# Patient Record
Sex: FEMALE | Race: WHITE | Employment: FULL TIME | URBAN - METROPOLITAN AREA
[De-identification: names, ages, dates, MRNs, and addresses within clinical notes are randomized per-mention and may not be internally consistent; named-entity substitution may affect disease eponyms.]

---

## 2022-12-08 ENCOUNTER — OFFICE VISIT (OUTPATIENT)
Dept: OBGYN CLINIC | Facility: CLINIC | Age: 32
End: 2022-12-08

## 2022-12-08 VITALS
WEIGHT: 119.4 LBS | BODY MASS INDEX: 20.38 KG/M2 | SYSTOLIC BLOOD PRESSURE: 98 MMHG | TEMPERATURE: 98 F | OXYGEN SATURATION: 99 % | HEART RATE: 102 BPM | DIASTOLIC BLOOD PRESSURE: 54 MMHG | HEIGHT: 64 IN

## 2022-12-08 DIAGNOSIS — Z3A.11 11 WEEKS GESTATION OF PREGNANCY: ICD-10-CM

## 2022-12-08 DIAGNOSIS — O99.321 DRUG USE AFFECTING PREGNANCY IN FIRST TRIMESTER: Primary | ICD-10-CM

## 2022-12-08 DIAGNOSIS — Z34.80 SUPERVISION OF OTHER NORMAL PREGNANCY, ANTEPARTUM: ICD-10-CM

## 2022-12-08 PROBLEM — O99.320 DRUG USE AFFECTING PREGNANCY: Status: ACTIVE | Noted: 2022-12-08

## 2022-12-08 NOTE — ASSESSMENT & PLAN NOTE
SLIUP c/w stated LMP, ELVIRA updated  Already taking PNV  Oriented to practice  Advised returning for OB intake within 1 week  MFM referral placed, desires genetic screening  OB precautions reviewed

## 2022-12-08 NOTE — PROGRESS NOTES
Subjective   Patient ID: Javier Thomas is a 34 y o  female  Patient is here for a problem visit  Chief Complaint   Patient presents with   • Possible Pregnancy     LMP approx 22; confirmed w/u/s at [facility] 11/15, was advised at that time she was about 9-10 wks     Sure LMP 22 - 11+0, 23  Did have an [address] at [facility] on 11/15 and was told 9-10 weeks at that time   Did not have health insurance until recently when she got a new job  Denies cramping or VB5  Mild nausea but no vomiting  Somewhat planned, desired pregnancy  No recent BCM   Cycles somewhat irregular    Last cocaine use early October - last rehab 4 years ago 1 month stay  No other drug/alcohol use     Desires genetic screening     Menstrual History:  OB History        2    Para        Term                AB   1    Living   0       SAB        IAB        Ectopic        Multiple        Live Births                    Patient's last menstrual period was 2022  Past Medical History:   Diagnosis Date   • Nasal septal perforation     secondary to cocaine use and digital trauma       History reviewed  No pertinent surgical history  Social History     Tobacco Use   • Smoking status: Never   • Smokeless tobacco: Never   Vaping Use   • Vaping Use: Never used   Substance Use Topics   • Alcohol use: Yes     Comment: OCCASIONAL   • Drug use: Yes     Types: Cocaine        Allergies   Allergen Reactions   • Latex Rash         Current Outpatient Medications:   •  Prenatal Vit-Fe Sulfate-FA-DHA (PRENATAL VITAMIN/MIN +DHA PO), Take by mouth, Disp: , Rfl:       Review of Systems   Constitutional: Negative for appetite change, chills and fever  Eyes: Negative for visual disturbance  Respiratory: Negative for cough, chest tightness and shortness of breath  Cardiovascular: Negative for chest pain     Gastrointestinal: Negative for abdominal distention, abdominal pain, constipation, diarrhea, nausea and vomiting  Endocrine: Negative for cold intolerance and heat intolerance  Genitourinary: Negative for difficulty urinating, dyspareunia, dysuria, frequency, genital sores, pelvic pain, urgency, vaginal bleeding, vaginal discharge and vaginal pain  Musculoskeletal: Negative for arthralgias  Neurological: Negative for light-headedness and headaches  Hematological: Does not bruise/bleed easily  Psychiatric/Behavioral: Negative for behavioral problems  All other systems reviewed and are negative  BP 98/54 (BP Location: Left arm, Patient Position: Sitting, Cuff Size: Adult)   Pulse 102   Temp 98 °F (36 7 °C) (Tympanic)   Ht 5' 4" (1 626 m)   Wt 54 2 kg (119 lb 6 4 oz)   LMP 09/22/2022   SpO2 99%   BMI 20 49 kg/m²       Physical Exam  Constitutional:       General: She is not in acute distress  Appearance: Normal appearance  HENT:      Head: Normocephalic and atraumatic  Cardiovascular:      Rate and Rhythm: Normal rate  Pulmonary:      Effort: Pulmonary effort is normal  No respiratory distress  Abdominal:      General: There is no distension  Palpations: Abdomen is soft  Tenderness: There is no abdominal tenderness  There is no guarding or rebound  Neurological:      General: No focal deficit present  Mental Status: She is alert  Psychiatric:         Mood and Affect: Mood normal          Behavior: Behavior normal    Vitals and nursing note reviewed  AMB US OB < 14 weeks single or first gestation level 1  Addendum: Please disregard ultrasound probe disinfection - transvaginal ultrasound  was not performed  Narrative: FIRST TRIMESTER OBSTETRIC ULTRASOUND  12/08/22    Taylor Cespedes MD       INDICATION: Amenorrhea, viability    COMPARISON: None  TECHNIQUE:   Transabdominal imaging was performed to assess the gestation,   myometrial/endometrial architecture and ovarian parenchymal detail      The study includes volumetric sweeps and traditional still imaging   technique  FINDINGS:     A single intrauterine gestation is identified  Cardiac activity is detected at 172  YOLK SAC:  Present and normal in size and appearance  MEAN CROWN RUMP LENGTH:  48 6 mm = 11 weeks 5 days   AMNIOTIC FLUID/SAC SHAPE:  Within expected normal range  UTERUS/ADNEXA:   No adnexal mass or pathologic cyst   No free fluid identified  Impression:    Single intrauterine pregnancy of 11 weeks 5 days gestational age  ELVIRA by [address] [facility] 6/24/23  Fetal cardiac activity detected  No adnexal masses seen  Final ELVIRA: 6/29/23 by LMP of 9/22/22    Ultrasound Probe Disinfection    A transvaginal ultrasound was performed  Prior to use, disinfection was performed with High Level Disinfection   Process ([employer])  Probe serial number F: [id number] was used  Appropriate laboratory testing, imaging studies, and prior external records were reviewed:     Assessment/Plan:       Problem List Items Addressed This Visit        Other    11 weeks gestation of pregnancy    Relevant Orders    Ambulatory Referral to Maternal Fetal Medicine    [facility] OB < 14 weeks single or first gestation level 1 (Completed)    Drug use affecting pregnancy - Primary     Cocaine use for many years with intermittent sobriety, however most recent use was earlier this pregnancy  Denies any use since learning she was pregnant   Reviewed increased risk of miscarriage, IUGR, still birth and recommended continued cessation         Supervision of other normal pregnancy, antepartum     SLIUP c/w stated LMP, ELVIRA updated  Already taking PNV  Oriented to practice  Advised returning for OB intake within 1 week  MFM referral placed, desires genetic screening  OB precautions reviewed

## 2022-12-08 NOTE — ASSESSMENT & PLAN NOTE
Cocaine use for many years with intermittent sobriety, however most recent use was earlier this pregnancy  Denies any use since learning she was pregnant   Reviewed increased risk of miscarriage, IUGR, still birth and recommended continued cessation

## 2022-12-09 ENCOUNTER — INITIAL PRENATAL (OUTPATIENT)
Dept: OBGYN CLINIC | Facility: CLINIC | Age: 32
End: 2022-12-09

## 2022-12-09 VITALS
WEIGHT: 123.4 LBS | HEART RATE: 77 BPM | HEIGHT: 64 IN | SYSTOLIC BLOOD PRESSURE: 96 MMHG | BODY MASS INDEX: 21.07 KG/M2 | DIASTOLIC BLOOD PRESSURE: 58 MMHG

## 2022-12-09 DIAGNOSIS — Z11.3 SCREENING FOR STD (SEXUALLY TRANSMITTED DISEASE): ICD-10-CM

## 2022-12-09 DIAGNOSIS — Z34.80 SUPERVISION OF OTHER NORMAL PREGNANCY, ANTEPARTUM: Primary | ICD-10-CM

## 2022-12-09 DIAGNOSIS — O99.321 DRUG USE AFFECTING PREGNANCY IN FIRST TRIMESTER: ICD-10-CM

## 2022-12-09 DIAGNOSIS — Z3A.11 11 WEEKS GESTATION OF PREGNANCY: ICD-10-CM

## 2022-12-09 PROBLEM — Z82.79 FAMILY HISTORY OF CLEFT PALATE: Status: ACTIVE | Noted: 2022-12-09

## 2022-12-09 NOTE — PROGRESS NOTES
34 y o  y/o female here for New OB  Pt  oriented to practice  Patient's medical, genetic, surgical and family history were reviewed in detail  Diagnostic, genetic & carrier testing discussed, and the patient is aware this testing may not be covered by her insurance  TV u/s done 12/8/22 confirms SLIUP consistent with 9/22/222 LMP, final [facility] 6/29/23  Canavan disease- negative  Cerebral palsy- negative  Cleft lip/palate- FOB's sister  Congenital anomalies- negative  Congenital heart disease- negative  Consanguinity- negative  Cystic fibrosis- negative  Down's syndrome- negative  Hemophilia- negative  Cristhian's chorea- negative  Mental retardation/ intellectual disability/ cognitive delays- negative  Muscular dystrophy- negative  Neural tube defect- negative  Sickle cell anemia- negative  Joshua-sachs disease- negative  Fragile X- negative  Thalassemia- negative  Autism- negative  Type 1 diabetes- negative  PKU- negative  Premature ovarian failure- negative      Age of patient: 34  Age of father of the baby: 34, Will Justino    Pre pregnancy weight: 112lb    Nausea: no  Heartburn: yes  Vomiting: no  Cramping/ pain: no  Bleeding since pregnant: no  Urinary complaints: + frequency since pregnant  No burning  Fever or rash since pregnant: no    Exposure chemicals/radiation:no  Alcohol exposure: 3-4 glasses wine since pregnancy  Medications taking since LMP:no  Drug exposure:no  Smoker: no  Cat litter exposure: no    Hx recurrent pregnancy loss/ stillbirth: no    Depression screen:  negative  Domestic violence screen: negative      ZIKA screening:  Travel/ or plans for travel outside / [place] area/ [place]:  no,  zika precautions given    TB screening:   ? HIV-no  ? Close contact with individuals with known or suspected TB-no  ? Medical conditions known to increase risk of disease if infected  ? Ie  Diabetes, lupus, cancer, alcoholism, drug addiction- no  ?  Birth in or emigration from [facility] countries- no  ? Medically underserved- no  ? Homeless- no  ? Living or working in long term care facilities - no    DRUG screening:  Parents:  Did any of your parents have a problem with alcohol or other drug use?  no    Partner: Does your partner have a problem with alcohol or drug use? no    Past: In the past, have you had difficulties in your life because of alcohol or other drugs, including prescription medication? 2019 rehad for cocaine  Last cocaine use 5 months ago  Sees group therapy weekly  Present: In the past month have you drunk any alcohol or used other drugs? Yes has had a glass of red wine      Diabetes risk screening:   Hx of macrosomia ( infant weight 9 lb or greater): no  HTN: no  Hx elevated HDL/ triglycerides:no  Hx PCOS or insulin resistance: no  1st degree relative with diabetes: no  Hx cardiovascular ds: no   [place], , native [place], : no  Previous GDM hx: no  Hx of elevated HgbA1c, glucose tolerance, fasting glucose: no    Thyroid disease risk:  BMI >30: no  Type 1 diabetes: no  Fhx or personal hx of thyroid disease: no    Other screening:  Hx of gastric bypass/ gastric sleeve/ gastric band: no    Hx of HSV for patient or partner: no    Hx of MRSA: no    Last pap: >5 years ago  Hx of abnormal pap smear: no  Hx of procedures to the cervix: no    Hx of chlamydia/ gonorrhea/ PID: no    Hx varicella: yes     hemoglobin electrophoresis completed in past: n/a    Ashkenazi Pentecostal/ [place]/ [place] descent: no, amber-sachs screening offered: no    Was this pregnancy a result of infertility treatment: no    Ok with blood transfusion if needed: yes    Plans for feeding baby: plans to breastfeed    Occupation: admin for [employer]    Flu vaccine: declines  Hep B vaccine: yes  COVID vaccine :  completed X 3  Hx of covid in last 3 months: no    Blood type: ?      ASA/ PEC screen:  Previous uncomplicated full-term delivery: no  Multifetal gestation- 2 points: 0  Chronic HTN- 2 points: 0  Type 1 or 2 pre-gestational diabetes- 2 points: 0  Renal disease- 2 points: 0  Autoimmune disease- 2 points: 0  History of preeclampsia- 2 points: 0  IVF pregnancy- 1 point: 0  >10 year pregnancy interval-1 point: 0  Previous pregnancy with IUGR/ SGA/ adverse pregnancy outcome-1 point: 0  Nulliparity- 1 point: 1  BMI >30- 1 point: 0  Family history of preeclampsia in mother or sister- 1 point: 0  Age >or = 28- 1 point: 0   Race- 1 point: 0  Low socioeconomic status-1 point: 0      TOTAL SCORE: 1      Pt has been recommended to take ASA 162mg during this pregnancy to lower her risk of preeclampsia: no      Current Outpatient Medications on File Prior to Visit   Medication Sig Dispense Refill   • Prenatal Vit-Fe Sulfate-FA-DHA (PRENATAL VITAMIN/MIN +DHA PO) Take by mouth       No current facility-administered medications on file prior to visit  No past surgical history on file  Past Medical History:   Diagnosis Date   • Nasal septal perforation     secondary to cocaine use and digital trauma           1  Pregnancy: H&P completed today  Prenatal course discussed with patient, including appointment schedule  Warning signs discussed and reviewed  OB folder given with warning signs of pregnancy, prenatal visit schedule, safe medications in pregnancy, childbirth classes, lab location info, [facility] info  2  Genetic testing: nuchal translucency/Sequential screening/ NIPT reviewed with patient -   Pt has 1st trimester US scheduled with [facility] 12/22/22  She wants NIPT testing    3  Carrier screening: CF and SMA ordered    4  OB exam:  see other encounter from today  5  Family history of cleft palate:  FOB's sister    10  History of cocaine use: last use per pt 5 months ago  Goes to group therapy weekly

## 2022-12-09 NOTE — PROGRESS NOTES
34 y o  y/o female here for New OB exam   She is 11w1d pregnant  OB intake done prior, see other encounter  TVUS 22 confirms SLIUP consistent with 22 LMP, final [facility] 23  ROS:  Nausea: no but + heartburn  Vomiting: no  Cramping/ pain:no  Bleeding since pregnant: no  Urinary complaints: frequency, but no burning  Fever or rash since pregnant: no    Exposure chemicals/radiation:no  Alcohol exposure: 4-5 glasses of red wine in last 3 months  Discussed that no further alcohol should be consumed during pregnancy  Medications taking since LMP:no  Drug exposure:no  Smoker: no      Diabetes risk screening:   Hx of macrosomia: no  HTN: no  Hx elevated HDL/ triglycerides:no  Hx PCOS: no  1st degree relative with diabetes: no  Hx cardiovascular ds: no       ASA/ PEC screen: negative      Current Outpatient Medications on File Prior to Visit   Medication Sig Dispense Refill   • Prenatal Vit-Fe Sulfate-FA-DHA (PRENATAL VITAMIN/MIN +DHA PO) Take by mouth       No current facility-administered medications on file prior to visit  History reviewed  No pertinent surgical history      Past Medical History:   Diagnosis Date   • Nasal septal perforation     secondary to cocaine use and digital trauma     OB History        2    Para        Term                AB   1    Living   0       SAB        IAB   1    Ectopic        Multiple        Live Births                     Vitals:    22 1400   BP: 96/58   Pulse: 77       Neck: supple without nodes or thyromegaly  Heart: regular rate and rhythm  Lungs: clear to auscultation without rales, rhonci  Breasts: nontender, no masses, no discoloration, no discharge  Abdomen: soft, nontender, no masses  Ext genitalia: no lesions, no discoloration  Urethra: no discharge or erythema  Bladder: nontender, good support  Vagina: no discharge, no lesions  Cervix: no lesions, no cervical motion tenderness, posterior, long, closed  Adnexa: nontender, no masses  Uterus: nontender, 11 wk size      1  Pregnancy: H&P completed today  PN Labs ordered  Prenatal course discussed with patient, including appointment schedule  Warning signs discussed and reviewed  OB folder given with warning signs of pregnancy, prenatal visit schedule, safe medications in pregnancy, childbirth classes, lab location info, [facility] info  2  Screening: Pap smear collected  GC/CT collected  3  Genetic/carrier testing: Sequential screening/ NIPT/ nuchal translucency/ cystic fibrosis/ SMA reviewed with patient - pt likely wants NIPT  Pt has appt with [facility] 12/22/22  CF and SMA was also ordered per pt request    4  Family history of cleft palate: FOB's sister    11  History of cocaine use:  Last use per pt was 5 months ago  Attends weekly group therapy  Pt unable to give urine specimen today  She does agree to a urine drug screen which was ordered with her PNL  Follow up: Return in 4 weeks  Precautions regarding labor, leakage, bleeding, and fetal movement reviewed

## 2022-12-12 LAB
C TRACH DNA SPEC QL NAA+PROBE: NEGATIVE
HPV HR 12 DNA CVX QL NAA+PROBE: POSITIVE
HPV16 DNA CVX QL NAA+PROBE: NEGATIVE
HPV18 DNA CVX QL NAA+PROBE: NEGATIVE
N GONORRHOEA DNA SPEC QL NAA+PROBE: NEGATIVE

## 2022-12-17 LAB
LAB AP GYN PRIMARY INTERPRETATION: NORMAL
Lab: NORMAL
PATH INTERP SPEC-IMP: NORMAL

## 2022-12-20 ENCOUNTER — TELEPHONE (OUTPATIENT)
Dept: OBGYN CLINIC | Facility: CLINIC | Age: 32
End: 2022-12-20

## 2022-12-20 PROBLEM — R87.619 ABNORMAL PAP SMEAR OF CERVIX: Status: ACTIVE | Noted: 2022-12-20

## 2022-12-20 PROBLEM — Z3A.13 13 WEEKS GESTATION OF PREGNANCY: Status: ACTIVE | Noted: 2022-12-08

## 2022-12-20 NOTE — TELEPHONE ENCOUNTER
Phone call to pt to discuss pap smear results  Discussed that pap negative but HPV high risk was positive  Per ASCCP guidelines pap due in 1 year  Pt aware and agreeable to plan

## 2022-12-22 ENCOUNTER — ROUTINE PRENATAL (OUTPATIENT)
Dept: PERINATAL CARE | Facility: OTHER | Age: 32
End: 2022-12-22

## 2022-12-22 VITALS
DIASTOLIC BLOOD PRESSURE: 60 MMHG | WEIGHT: 127.8 LBS | HEIGHT: 65 IN | BODY MASS INDEX: 21.29 KG/M2 | SYSTOLIC BLOOD PRESSURE: 98 MMHG | HEART RATE: 83 BPM

## 2022-12-22 DIAGNOSIS — Z3A.11 11 WEEKS GESTATION OF PREGNANCY: ICD-10-CM

## 2022-12-22 DIAGNOSIS — Z3A.13 13 WEEKS GESTATION OF PREGNANCY: Primary | ICD-10-CM

## 2022-12-22 DIAGNOSIS — Z36.82 ENCOUNTER FOR (NT) NUCHAL TRANSLUCENCY SCAN: ICD-10-CM

## 2022-12-22 RX ORDER — FERROUS SULFATE 325(65) MG
325 TABLET ORAL 2 TIMES WEEKLY
COMMUNITY

## 2022-12-22 NOTE — PROGRESS NOTES
Derrick Morrissey: Ms Odilia Riggins was seen today at 13w5d for nuchal translucency ultrasound  See ultrasound report under "OB Procedures" tab  My recommendations are as follows:  1  We reviewed the availability of genetic screening, as well as diagnostic testing, which are available to all pregnant women  We reviewed limitations, risks, and benefits of screening and testing  She elected to proceed with Non Invasive Prenatal Screening (NIPS)  MSAFP screening should be ordered through your office at 15-20 weeks gestation, and completed prior to fetal anatomic survey  She does not wish to pursue diagnostic testing at this time  A detailed anatomic survey as well as transvaginal cervical length screening are recommended between 18-22 weeks gestation  I did change her EDC today to 6/24/23 based on her earliest ultrasound because she told me her periods were irregular and she was intermittently taking oral contraceptives when she conceived        Please don't hesitate to contact our office with any concerns or questions     -Adam Holcomb MD

## 2022-12-22 NOTE — PROGRESS NOTES
Patient chose to have [facility] Non-invasive Prenatal Screen with fetal sex  Patient given brochure and is aware [facility] will contact their insurance and coordinate coverage  Patient made aware she will need to respond to text message or e-mail from [employer] within 2 business days or testing will be run through insurance  Patient informed text message will come from area code  "415"  Provided [employer] # [phone] and web site : [email or web address]    "[facility] your test online" card with barcode and test tube ID provided to patient  Reviewed [facility]'s web site states 5-7 business days for results via their portal    [employer] message will be sent to patient when MFM receives results /provider reviews  1 vials of blood drawn from left arm by [employer]  Patient tolerated blood draw without difficulty  Specimens labeled with patient identifiers (name, date of birth, specimen collection date), order and specimen were verified with patient, packed and sent via [employer]  Copy of lab order scanned to Epic media  Maternal Fetal Medicine will have results in approximately 7-10 business days and will call patient or notify via [address]  Patient aware viewing lab result online will reveal fetal sex if ordered  Patient verbalized understanding of all instructions and no questions at this time  She wanted to pay out of pocket rather than waiting for Prior Auth from [facility]

## 2022-12-22 NOTE — LETTER
December 22, 2022     Joya Patel [address] [facility],[address] [place] [address], [facility] [address] [id number]    Patient: Lesia Moran   YOB: 1990   Date of Visit: 12/22/2022       Dear Dr Michelle Gonsales: Thank you for referring Lesia Moran to me for evaluation  Below are my notes for this consultation  If you have questions, please do not hesitate to call me  I look forward to following your patient along with you  Sincerely,        Jerson Oropeza MD        CC: No Recipients  Jerson Oropeza MD  12/22/2022  3:39 PM  Sign when Signing Visit  [facility]: Ms Diaz Friday was seen today at 13w5d for nuchal translucency ultrasound  See ultrasound report under "OB Procedures" tab  My recommendations are as follows:  1  We reviewed the availability of genetic screening, as well as diagnostic testing, which are available to all pregnant women  We reviewed limitations, risks, and benefits of screening and testing  She elected to proceed with Non Invasive Prenatal Screening (NIPS)  MSAFP screening should be ordered through your office at 15-20 weeks gestation, and completed prior to fetal anatomic survey  She does not wish to pursue diagnostic testing at this time  A detailed anatomic survey as well as transvaginal cervical length screening are recommended between 18-22 weeks gestation  I did change her EDC today to 6/24/23 based on her earliest ultrasound because she told me her periods were irregular and she was intermittently taking oral contraceptives when she conceived        Please don't hesitate to contact our office with any concerns or questions     -Jerson Oropeza MD

## 2023-01-03 ENCOUNTER — TELEPHONE (OUTPATIENT)
Dept: OBGYN CLINIC | Facility: CLINIC | Age: 33
End: 2023-01-03

## 2023-01-03 DIAGNOSIS — Z36.1 NEED FOR MATERNAL SERUM ALPHA-PROTEIN (MSAFP) SCREENING: ICD-10-CM

## 2023-01-03 DIAGNOSIS — Z3A.15 15 WEEKS GESTATION OF PREGNANCY: Primary | ICD-10-CM

## 2023-01-03 NOTE — TELEPHONE ENCOUNTER
----- Message from Aditi Guerra RN sent at 1/3/2023  9:47 AM EST -----  Dear Dr Marleni Marcial,  This NIPS result is low-risk, and patient has been advised via [address]  MSAFP should be ordered by your office to be completed between 15-20 weeks gestation  Thank you    Su Lou RN

## 2023-01-04 PROBLEM — F19.91 HISTORY OF RECREATIONAL DRUG USE: Status: ACTIVE | Noted: 2022-12-08

## 2023-01-04 PROBLEM — Z3A.16 16 WEEKS GESTATION OF PREGNANCY: Status: ACTIVE | Noted: 2022-12-08

## 2023-01-04 PROBLEM — R87.810 PAP SMEAR OF CERVIX SHOWS HIGH RISK HPV PRESENT: Status: ACTIVE | Noted: 2022-12-20

## 2023-01-05 NOTE — PROGRESS NOTES
OB/GYN  PN Visit  Henrry Terrazas  [id number]  2023  8:00 AM  Dr Demetria Gross MD    S: 34 y o   16 weeks 0d here for PN visit  Chief Complaint   Patient presents with   • Routine Prenatal Visit         OB complaints:  Contractions: no  Leakage: no  Bleeding: no  Fetal movement: yes      O:  /62 (BP Location: Right arm, Patient Position: Sitting, Cuff Size: Adult)   Pulse 99   Ht 5' 5" (1 651 m)   Wt 58 1 kg (128 lb)   LMP 2022 (Approximate)   BMI 21 30 kg/m²       Review of Systems   Constitutional: Negative  HENT: Negative  Eyes: Negative  Respiratory: Negative  Cardiovascular: Negative  Gastrointestinal: Negative  Endocrine: Negative  Genitourinary:        As noted in HPI   Musculoskeletal: Negative  Skin: Negative  Allergic/Immunologic: Negative  Neurological: Negative  Hematological: Negative  Psychiatric/Behavioral: Negative  Physical Exam  Constitutional:       General: She is not in acute distress  Appearance: She is well-developed  Abdominal:      Palpations: Abdomen is soft  Tenderness: There is no abdominal tenderness  There is no guarding  Neurological:      Mental Status: She is alert and oriented to person, place, and time  Skin:     General: Skin is warm and dry     Psychiatric:         Behavior: Behavior normal              Pregravid Weight/BMI: 50 8 kg (112 lb) (BMI 18 64)  Current Weight: 58 1 kg (128 lb)   Total Weight Gain: 7 258 kg (16 lb)   Pre- Vitals    Flowsheet Row Most Recent Value   Prenatal Assessment    Fetal Heart Rate 148   Movement Present   Prenatal Vitals    Blood Pressure 118/62   Weight - Scale 58 1 kg (128 lb)   Urine Albumin/Glucose    Dilation/Effacement/Station    Vaginal Drainage    Edema          Also confirmed FHR by US    Problem List        Other    16 weeks gestation of pregnancy    Overview     Declines flu vaccine  COVID vaccine received and booster         History of recreational drug use    Overview     Hx of cocaine use  No use since 2022    Pt attends weekly group therapy    Urine drug screen ordered         Supervision of other normal pregnancy, antepartum    Overview     Declines flu vaccine  covid vaccine completed x 3         Family history of cleft palate    Overview     FOB's sister         Pap smear of cervix shows high risk HPV present    Overview     2022 pap: negative, HPV HR +, HPV 16/18 negative  Per ASCCP guidelines pap due 2023__        Other Visit Diagnoses     Second trimester pregnancy    -  Primary         Declines flu vaccine  NIPT done  Prenatal panel pending - advised to complete  AFP at 16-18 weeks  Follow-up in 4 weeks        Future Appointments   Date Time Provider Gonzalo Santos   2/3/2023  3:15 PM Dagoberto Colón MD [facility]   2023  2:30 PM   [facility]               Leonel Freeman MD  2023  8:00 AM

## 2023-01-05 NOTE — PATIENT INSTRUCTIONS
Pregnancy at 15 to 18 Weeks   [address]:   What changes are happening in my body? Now that you are in your second trimester, you have more energy  You may also feel hungrier than usual  You may start to experience other symptoms, such as heartburn or dizziness  You may be gaining about ½ to 1 pound a week, and your pregnancy is beginning to show  You may need to start wearing maternity clothes  How do I care for myself at this stage of my pregnancy? Manage heartburn  by eating 4 or 5 small meals each day instead of large meals  Avoid spicy foods  Avoid eating right before bedtime  Manage nausea and vomiting  Avoid fatty and spicy foods  Eat small meals throughout the day instead of large meals  Kassie may help to decrease nausea  Ask your healthcare provider about other ways of decreasing nausea and vomiting  Eat a variety of healthy foods  Healthy foods include fruits, vegetables, whole-grain breads, low-fat dairy foods, beans, lean meats, and fish  Drink liquids as directed  Ask how much liquid to drink each day and which liquids are best for you  Limit caffeine to less than 200 milligrams each day  Limit your intake of fish to 2 servings each week  Choose fish low in mercury such as canned light tuna, shrimp, salmon, cod, or tilapia  Do not  eat fish high in mercury such as swordfish, tilefish, swati mackerel, and shark  Take prenatal vitamins as directed  Your need for certain vitamins and minerals, such as folic acid, increases during pregnancy  Prenatal vitamins provide some of the extra vitamins and minerals you need  Prenatal vitamins may also help to decrease the risk of certain birth defects  Do not smoke  Smoking increases your risk of a miscarriage and other health problems during your pregnancy  Smoking can cause your baby to be born too early or weigh less at birth  Ask your healthcare provider for information if you need help quitting      Do not drink alcohol  Alcohol passes from your body to your baby through the placenta  It can affect your baby's brain development and cause fetal alcohol syndrome (FAS)  FAS is a group of conditions that causes mental, behavior, and growth problems  Talk to your healthcare provider before you take any medicines  Many medicines may harm your baby if you take them when you are pregnant  Do not take any medicines, vitamins, herbs, or supplements without first talking to your healthcare provider  Never use illegal or street drugs (such as marijuana or cocaine) while you are pregnant  What are some safety tips during pregnancy? Avoid hot tubs and saunas  Do not use a hot tub or sauna while you are pregnant, especially during your first trimester  Hot tubs and saunas may raise your baby's temperature and increase the risk of birth defects  Avoid toxoplasmosis  This is an infection caused by eating raw meat or being around infected cat feces  It can cause birth defects, miscarriages, and other problems  Wash your hands after you touch raw meat  Make sure any meat is well-cooked before you eat it  Avoid raw eggs and unpasteurized milk  Use gloves or ask someone else to clean your cat's litter box while you are pregnant  What changes are happening with my baby? By 18 weeks, your baby may be about 6 inches long from the top of the head to the rump (baby's bottom)  Your baby may weigh about 11 ounces  You may be able to feel your baby's movement at about 18 weeks or later  The first movements may not be that noticeable  They may feel like a fluttering sensation  Your baby also makes sucking movements and can hear certain sounds  What do I need to know about prenatal care? During the first 28 weeks of your pregnancy, you will see your healthcare provider once a month  Your healthcare provider will check your blood pressure and weight   You may also need any of the following:  A urine test  may also be done to check for sugar and protein  These can be signs of gestational diabetes or infection  A blood test  may be done to check for anemia (low iron level)  Fundal height check  is a measurement of your uterus to check your baby's growth  This number is usually the same as the number of weeks that you have been pregnant  An ultrasound  may be done to check your baby's development  Your healthcare provider may be able to tell you what your baby's gender is during the ultrasound  Your baby's heart rate  will be checked  When should I seek immediate care? You have pain or cramping in your abdomen or low back  You have heavy vaginal bleeding or clotting  You pass material that looks like tissue or large clots  Collect the material and bring it with you  When should I call my doctor or obstetrician? You cannot keep food or drinks down, and you are losing weight  You have light bleeding  You have chills or a fever  You have vaginal itching, burning, or pain  You have yellow, green, white, or foul-smelling vaginal discharge  You have pain or burning when you urinate, less urine than usual, or pink or bloody urine  You have questions or concerns about your condition or care  CARE AGREEMENT:   You have the right to help plan your care  Learn about your health condition and how it may be treated  Discuss treatment options with your healthcare providers to decide what care you want to receive  You always have the right to refuse treatment  The above information is an  only  It is not intended as medical advice for individual conditions or treatments  Talk to your doctor, nurse or pharmacist before following any medical regimen to see if it is safe and effective for you  © Copyright [employer] 2022 Information is for End User's use only and may not be sold, redistributed or otherwise used for commercial purposes   All illustrations and images included in CareNotes® are the copyrighted property of A D A M , Inc  or [address] [place], [place] [address] [facility]

## 2023-01-06 ENCOUNTER — ROUTINE PRENATAL (OUTPATIENT)
Dept: OBGYN CLINIC | Facility: CLINIC | Age: 33
End: 2023-01-06

## 2023-01-06 VITALS
SYSTOLIC BLOOD PRESSURE: 118 MMHG | DIASTOLIC BLOOD PRESSURE: 62 MMHG | WEIGHT: 128 LBS | BODY MASS INDEX: 21.33 KG/M2 | HEART RATE: 99 BPM | HEIGHT: 65 IN

## 2023-01-06 DIAGNOSIS — F19.91 HISTORY OF RECREATIONAL DRUG USE: ICD-10-CM

## 2023-01-06 DIAGNOSIS — R87.810 PAP SMEAR OF CERVIX SHOWS HIGH RISK HPV PRESENT: ICD-10-CM

## 2023-01-06 DIAGNOSIS — Z34.80 SUPERVISION OF OTHER NORMAL PREGNANCY, ANTEPARTUM: ICD-10-CM

## 2023-01-06 DIAGNOSIS — Z3A.16 16 WEEKS GESTATION OF PREGNANCY: ICD-10-CM

## 2023-01-06 DIAGNOSIS — Z82.79 FAMILY HISTORY OF CLEFT PALATE: ICD-10-CM

## 2023-01-06 DIAGNOSIS — Z34.92 SECOND TRIMESTER PREGNANCY: Primary | ICD-10-CM

## 2023-01-17 ENCOUNTER — TELEPHONE (OUTPATIENT)
Dept: OBGYN CLINIC | Facility: CLINIC | Age: 33
End: 2023-01-17

## 2023-01-17 NOTE — TELEPHONE ENCOUNTER
Pt currently 17w3d pregnant  She states over the past few days she has noticed that her left breast is leaking fluid  It is in a minimal amount, but enough to notice  She has no redness or lumps in this breast    In her Right breast, she had she has red bruising under her nipple and a hard lump for the past 2 days

## 2023-01-18 ENCOUNTER — APPOINTMENT (OUTPATIENT)
Dept: LAB | Age: 33
End: 2023-01-18

## 2023-01-18 ENCOUNTER — ROUTINE PRENATAL (OUTPATIENT)
Dept: OBGYN CLINIC | Facility: CLINIC | Age: 33
End: 2023-01-18

## 2023-01-18 ENCOUNTER — TELEPHONE (OUTPATIENT)
Dept: OBGYN CLINIC | Facility: CLINIC | Age: 33
End: 2023-01-18

## 2023-01-18 VITALS
SYSTOLIC BLOOD PRESSURE: 102 MMHG | WEIGHT: 134.6 LBS | TEMPERATURE: 98.3 F | OXYGEN SATURATION: 99 % | BODY MASS INDEX: 22.42 KG/M2 | HEART RATE: 97 BPM | HEIGHT: 65 IN | DIASTOLIC BLOOD PRESSURE: 62 MMHG

## 2023-01-18 DIAGNOSIS — Z82.79 FAMILY HISTORY OF CLEFT PALATE: ICD-10-CM

## 2023-01-18 DIAGNOSIS — Z34.80 SUPERVISION OF OTHER NORMAL PREGNANCY, ANTEPARTUM: ICD-10-CM

## 2023-01-18 DIAGNOSIS — N61.1 BREAST ABSCESS: ICD-10-CM

## 2023-01-18 DIAGNOSIS — Z36.1 NEED FOR MATERNAL SERUM ALPHA-PROTEIN (MSAFP) SCREENING: ICD-10-CM

## 2023-01-18 DIAGNOSIS — F19.91 HISTORY OF RECREATIONAL DRUG USE: Primary | ICD-10-CM

## 2023-01-18 DIAGNOSIS — Z3A.15 15 WEEKS GESTATION OF PREGNANCY: ICD-10-CM

## 2023-01-18 LAB
ABO GROUP BLD: NORMAL
BASOPHILS # BLD AUTO: 0.03 THOUSANDS/ÂΜL (ref 0–0.1)
BASOPHILS NFR BLD AUTO: 0 % (ref 0–1)
BILIRUB UR QL STRIP: NEGATIVE
BLD GP AB SCN SERPL QL: NEGATIVE
CLARITY UR: CLEAR
COLOR UR: COLORLESS
EOSINOPHIL # BLD AUTO: 0.42 THOUSAND/ÂΜL (ref 0–0.61)
EOSINOPHIL NFR BLD AUTO: 4 % (ref 0–6)
ERYTHROCYTE [DISTWIDTH] IN BLOOD BY AUTOMATED COUNT: 12.8 % (ref 11.6–15.1)
GLUCOSE UR STRIP-MCNC: NEGATIVE MG/DL
HBV SURFACE AG SER QL: NORMAL
HCT VFR BLD AUTO: 34 % (ref 34.8–46.1)
HCV AB SER QL: NORMAL
HGB BLD-MCNC: 11.1 G/DL (ref 11.5–15.4)
HGB UR QL STRIP.AUTO: NEGATIVE
HIV 1+2 AB+HIV1 P24 AG SERPL QL IA: NORMAL
HIV 2 AB SERPL QL IA: NORMAL
HIV1 AB SERPL QL IA: NORMAL
HIV1 P24 AG SERPL QL IA: NORMAL
IMM GRANULOCYTES # BLD AUTO: 0.03 THOUSAND/UL (ref 0–0.2)
IMM GRANULOCYTES NFR BLD AUTO: 0 % (ref 0–2)
KETONES UR STRIP-MCNC: NEGATIVE MG/DL
LEUKOCYTE ESTERASE UR QL STRIP: NEGATIVE
LYMPHOCYTES # BLD AUTO: 1.7 THOUSANDS/ÂΜL (ref 0.6–4.47)
LYMPHOCYTES NFR BLD AUTO: 15 % (ref 14–44)
MCH RBC QN AUTO: 30 PG (ref 26.8–34.3)
MCHC RBC AUTO-ENTMCNC: 32.6 G/DL (ref 31.4–37.4)
MCV RBC AUTO: 92 FL (ref 82–98)
MONOCYTES # BLD AUTO: 0.39 THOUSAND/ÂΜL (ref 0.17–1.22)
MONOCYTES NFR BLD AUTO: 4 % (ref 4–12)
NEUTROPHILS # BLD AUTO: 8.61 THOUSANDS/ÂΜL (ref 1.85–7.62)
NEUTS SEG NFR BLD AUTO: 77 % (ref 43–75)
NITRITE UR QL STRIP: NEGATIVE
NRBC BLD AUTO-RTO: 0 /100 WBCS
PH UR STRIP.AUTO: 7 [PH]
PLATELET # BLD AUTO: 333 THOUSANDS/UL (ref 149–390)
PMV BLD AUTO: 10.1 FL (ref 8.9–12.7)
PROT UR STRIP-MCNC: NEGATIVE MG/DL
RBC # BLD AUTO: 3.7 MILLION/UL (ref 3.81–5.12)
RH BLD: POSITIVE
RPR SER QL: NORMAL
RUBV IGG SERPL IA-ACNC: 25.9 IU/ML
SP GR UR STRIP.AUTO: 1.01 (ref 1–1.03)
SPECIMEN EXPIRATION DATE: NORMAL
UROBILINOGEN UR STRIP-ACNC: <2 MG/DL
WBC # BLD AUTO: 11.18 THOUSAND/UL (ref 4.31–10.16)

## 2023-01-18 RX ORDER — CEPHALEXIN 500 MG/1
500 CAPSULE ORAL EVERY 6 HOURS SCHEDULED
Qty: 28 CAPSULE | Refills: 0 | Status: SHIPPED | OUTPATIENT
Start: 2023-01-18 | End: 2023-01-26

## 2023-01-18 NOTE — PROGRESS NOTES
OB/GYN  PN Visit  Mainor Edwards  [id number]  1/18/2023  4:10 PM  Dr Latoya Katz MD    S: 34 y o  Albert Petitm 17w4d here for PN visit  Chief Complaint   Patient presents with   • Pregnancy Problem     Breast tenderness, lump, hot to touch; pt also would like to discuss lab results         Patient reports breast pain for the past 2 to 3 days  Patient reports right breast hurts and is tender to the touch  She denies any nipple discharge  O:  /62 (BP Location: Right arm, Patient Position: Sitting, Cuff Size: Adult)   Pulse 97   Temp 98 3 °F (36 8 °C) (Tympanic)   Ht 5' 5" (1 651 m)   Wt 61 1 kg (134 lb 9 6 oz)   LMP 09/22/2022 (Approximate)   SpO2 99%   BMI 22 40 kg/m²       Review of Systems   Constitutional: Negative  HENT: Negative  Eyes: Negative  Respiratory: Negative  Cardiovascular: Negative  Gastrointestinal: Negative  Endocrine: Negative  Genitourinary:        As noted in HPI   Musculoskeletal: Negative  Skin: Negative  Allergic/Immunologic: Negative  Neurological: Negative  Hematological: Negative  Psychiatric/Behavioral: Negative  Physical Exam  Constitutional:       General: She is not in acute distress  Appearance: Normal appearance  She is well-developed  Genitourinary:   Breasts:     Right: Mass present  No bleeding, inverted nipple, nipple discharge, skin change or tenderness  Left: Normal  No bleeding, inverted nipple, mass, nipple discharge, skin change or tenderness  HENT:      Head: Normocephalic  Nose: Nose normal    Pulmonary:      Effort: Pulmonary effort is normal    Chest:      Chest wall: No mass, lacerations, deformity, swelling, tenderness, crepitus or edema  Abdominal:      General: There is no distension  Palpations: Abdomen is soft  Tenderness: There is no abdominal tenderness  There is no guarding     Lymphadenopathy:      Upper Body:      Right upper body: No supraclavicular or axillary adenopathy  Left upper body: No supraclavicular or axillary adenopathy  Neurological:      General: No focal deficit present  Mental Status: She is alert and oriented to person, place, and time  Skin:     General: Skin is warm and dry  Psychiatric:         Mood and Affect: Mood normal          Behavior: Behavior normal          Thought Content: Thought content normal    Vitals reviewed  Obtained verbal consent to obtain photo documentation/clinical IMAGE on [employer] ratna for the purpose of medical records      Pregravid Weight/BMI: 50 8 kg (112 lb) (BMI 18 64)  Current Weight: 61 1 kg (134 lb 9 6 oz)   Total Weight Gain: 10 3 kg (22 lb 9 6 oz)   Pre- Vitals    Flowsheet Row Most Recent Value   Prenatal Assessment    Fetal Heart Rate 150   Prenatal Vitals    Blood Pressure 102/62   Weight - Scale 61 1 kg (134 lb 9 6 oz)   Urine Albumin/Glucose    Dilation/Effacement/Station    Vaginal Drainage    Edema            Problem List        Other    16 weeks gestation of pregnancy    Overview     Declines flu vaccine  COVID vaccine received and booster         History of recreational drug use    Overview     Hx of cocaine use  No use since 2022  Pt attends weekly group therapy    Urine drug screen ordered         Supervision of other normal pregnancy, antepartum    Overview     Declines flu vaccine  covid vaccine completed x 3         Family history of cleft palate    Overview     FOB's sister         Pap smear of cervix shows high risk HPV present    Overview     2022 pap: negative, HPV HR +, HPV 16/18 negative  Per ASCCP guidelines pap due 2023__        Other Visit Diagnoses     Breast abscess             Discussed with patient slightly elevated WBC count of 11 which could be from breast abscess versus pregnancy  Advised antibiotics for now, breast ultrasound stat and possible need for aspiration or drainage by breast surgeon    Referral was made    Advised patient to call if with fevers or chills  Follow-up in 1 week  Advised Tylenol as needed for pain      Future Appointments   Date Time Provider Gonzalo Santos   2023  9:45 AM MO US RBC 2 MO RBC US MO RBC   2023  2:15 PM Dago Aparicio MD [facility]   2/3/2023  3:15 PM Laurita Galindo MD [facility]   2023  3:00 PM  [facility]   2023  8:45 AM Farhana Leblanc MD  [facility]               Dago Aparicio MD  2023  4:10 PM

## 2023-01-18 NOTE — TELEPHONE ENCOUNTER
Patient calling in regards to concerns of her right breast  Patient is currently 17w4d  Patient has noticed redness that turned into more of a purple bruise  Patient stating it is red/hot to the touch and hurts   Patient stating she has swelling in her lymph nodes  Spoke to Dr Ryan Beckwith, patient was advised to come into the office today  Patient understands and will be in the office for 3

## 2023-01-19 LAB — BACTERIA UR CULT: NORMAL

## 2023-01-20 ENCOUNTER — HOSPITAL ENCOUNTER (OUTPATIENT)
Dept: ULTRASOUND IMAGING | Facility: CLINIC | Age: 33
End: 2023-01-20

## 2023-01-20 ENCOUNTER — TELEPHONE (OUTPATIENT)
Dept: SURGICAL ONCOLOGY | Facility: CLINIC | Age: 33
End: 2023-01-20

## 2023-01-20 DIAGNOSIS — N61.1 BREAST ABSCESS: ICD-10-CM

## 2023-01-20 LAB
2ND TRIMESTER 4 SCREEN SERPL-IMP: NORMAL
AFP ADJ MOM SERPL: 0.58
AFP INTERP AMN-IMP: NORMAL
AFP INTERP SERPL-IMP: NORMAL
AFP INTERP SERPL-IMP: NORMAL
AFP SERPL-MCNC: 28.1 NG/ML
AGE AT DELIVERY: 33.9 YR
GA METHOD: NORMAL
GA: 17.6 WEEKS
IDDM PATIENT QL: NO
MULTIPLE PREGNANCY: NO
NEURAL TUBE DEFECT RISK FETUS: NORMAL %

## 2023-01-20 NOTE — PROGRESS NOTES
Met with patient and Dr Pina Barrett regarding recommendation for;    __X___ RIGHT ______LEFT      __X___Ultrasound guided  ______Stereotactic breast and lymph node biopsy  __X___Verbalized understanding        Blood thinners:  No: __X___ Yes: ______ What:                 Biopsy teaching sheet given:  Yes: ___X___ No: ________    Pt given contact information and adv to call with any questions/needs

## 2023-01-20 NOTE — TELEPHONE ENCOUNTER
Called patient to discuss her consult appointment with Dr Maria Isabel Mendez on 2/23  Patient was referred to our office for a possible breast abscess by her OB/GYN earlier this week and was started on antibiotics at that same time  Patient reported that she was seen at the [facility] in [place] today and they recommended a biopsy of a mass in her breast  Explained that Dr Maria Isabel Mendez would need those biopsy results back prior to her being seen and that due to him being out of the office in the beginning of February, the appointment she currently has scheduled on 2/23 is the soonest  Informed patient that if anything changed, she would receive another call  Patient verbalized understanding

## 2023-01-21 LAB
HGB A MFR BLD: 2.7 % (ref 1.8–3.2)
HGB A MFR BLD: 97.3 % (ref 96.4–98.8)
HGB F MFR BLD: 0 % (ref 0–2)
HGB FRACT BLD-IMP: NORMAL
HGB S MFR BLD: 0 %

## 2023-01-21 NOTE — RESULT ENCOUNTER NOTE
Notify patient that her breast US was reviewed and agree with biopsy recommended and I see she is scheduled for 1/25

## 2023-01-24 PROBLEM — Z3A.18 18 WEEKS GESTATION OF PREGNANCY: Status: ACTIVE | Noted: 2022-12-08

## 2023-01-24 LAB
CLINICAL INFO: NORMAL
ETHNIC BACKGROUND STATED: NORMAL
GENE MUT TESTED BLD/T: NORMAL
GENERAL COMMENTS:: NORMAL
LAB DIRECTOR NAME PROVIDER: NORMAL
REASON FOR REFERRAL (NARRATIVE): NORMAL
REF LAB TEST METHOD: NORMAL
SL AMB DISCLAIMER: NORMAL
SL AMB GENETIC COUNSELOR: NORMAL
SMN1 GENE MUT ANL BLD/T: NORMAL
SPECIMEN SOURCE: NORMAL

## 2023-01-24 NOTE — PROGRESS NOTES
OB/GYN  PN Visit  Salo Roberson  [id number]  2023  11:12 PM  Dr Leonora Vaz MD    S: 34 y o   18w5d here for PN visit  Chief Complaint   Patient presents with   • Routine Prenatal Visit     Patient reports no concerns  OB complaints:  Contractions: no  Leakage: no  Bleeding: no  Fetal movement: yes      O:  /82 (BP Location: Right arm, Cuff Size: Standard)   Pulse 97   Temp (!) 96 8 °F (36 °C) (Tympanic)   Wt 63 kg (139 lb)   LMP 2022 (Approximate)   BMI 23 13 kg/m²       Review of Systems   Constitutional: Negative  HENT: Negative  Eyes: Negative  Respiratory: Negative  Cardiovascular: Negative  Gastrointestinal: Negative  Endocrine: Negative  Genitourinary:        As noted in HPI   Musculoskeletal: Negative  Skin: Negative  Allergic/Immunologic: Negative  Neurological: Negative  Hematological: Negative  Psychiatric/Behavioral: Negative  Physical Exam  Constitutional:       General: She is not in acute distress  Appearance: Normal appearance  She is well-developed  Genitourinary:   Breasts:     Right: Mass and skin change present  No swelling, bleeding, inverted nipple, nipple discharge or tenderness  Left: Normal  No bleeding, inverted nipple, mass, nipple discharge, skin change or tenderness  HENT:      Head: Normocephalic  Nose: Nose normal    Pulmonary:      Effort: Pulmonary effort is normal    Chest:      Chest wall: No mass, lacerations, deformity, swelling, tenderness, crepitus or edema  Abdominal:      General: There is no distension  Palpations: Abdomen is soft  Tenderness: There is no abdominal tenderness  There is no guarding  Lymphadenopathy:      Upper Body:      Right upper body: No supraclavicular or axillary adenopathy  Left upper body: No supraclavicular or axillary adenopathy  Neurological:      General: No focal deficit present        Mental Status: She is alert and oriented to person, place, and time  Skin:     General: Skin is warm and dry  Psychiatric:         Mood and Affect: Mood normal          Behavior: Behavior normal          Thought Content: Thought content normal    Vitals reviewed  Obtained verbal consent to obtain photo documentation/clinical IMAGE on [employer] ratna for the purpose of medical records        Pregravid Weight/BMI: 50 8 kg (112 lb) (BMI 18 64)  Current Weight: 63 kg (139 lb)   Total Weight Gain: 12 2 kg (27 lb)   Pre-Eduardo Vitals    Flowsheet Row Most Recent Value   Prenatal Assessment    Fetal Heart Rate 155   Prenatal Vitals    Blood Pressure 100/82   Weight - Scale 63 kg (139 lb)   Urine Albumin/Glucose    Dilation/Effacement/Station    Vaginal Drainage    Edema            Problem List        Other    18 weeks gestation of pregnancy    Overview     Declines flu vaccine  COVID vaccine received and booster         History of recreational drug use    Overview     Hx of cocaine use  No use since 2022  Pt attends weekly group therapy    Urine drug screen ordered         Supervision of other normal pregnancy, antepartum    Overview     Declines flu vaccine  covid vaccine completed x 3         Family history of cleft palate    Overview     FOB's sister         Pap smear of cervix shows high risk HPV present    Overview     2022 pap: negative, HPV HR +, HPV 16/18 negative  Per ASCCP guidelines pap due 2023__        Other Visit Diagnoses     Second trimester pregnancy    -  Primary    Breast abscess             Breast abscess - stable  Biopsy results reviewed with patient  No signs of systemic illness - advised to call if with fevers and continue antibiotics    Sent Epic message to Dr Tonya Iqbal (breast specialist) to arrange follow-up for pt    Follow-up in 1 week for routine OB visit  Advised Tylenol for pain, ice or heat          Future Appointments   Date Time Provider Gonzalo Santos   2/3/2023  3:15 PM Mir Oleary MD [facility]   2023  3:00 PM  [facility] [place]   2023  8:45 AM MD Latia Galvin MD  2023  11:12 PM

## 2023-01-25 ENCOUNTER — HOSPITAL ENCOUNTER (OUTPATIENT)
Dept: ULTRASOUND IMAGING | Facility: CLINIC | Age: 33
Discharge: HOME | End: 2023-01-25

## 2023-01-25 VITALS — DIASTOLIC BLOOD PRESSURE: 89 MMHG | SYSTOLIC BLOOD PRESSURE: 136 MMHG | HEART RATE: 76 BPM

## 2023-01-25 DIAGNOSIS — R92.8 ABNORMAL ULTRASOUND OF BREAST: ICD-10-CM

## 2023-01-25 LAB
CF COMMENT: NORMAL
CFTR MUT ANL BLD/T: NORMAL

## 2023-01-25 RX ORDER — LIDOCAINE HYDROCHLORIDE 10 MG/ML
5 INJECTION, SOLUTION EPIDURAL; INFILTRATION; INTRACAUDAL; PERINEURAL ONCE
Status: COMPLETED | OUTPATIENT
Start: 2023-01-25 | End: 2023-01-25

## 2023-01-25 RX ADMIN — LIDOCAINE HYDROCHLORIDE 10 ML: 10 INJECTION, SOLUTION EPIDURAL; INFILTRATION; INTRACAUDAL; PERINEURAL at 12:18

## 2023-01-25 RX ADMIN — LIDOCAINE HYDROCHLORIDE 5 ML: 10 INJECTION, SOLUTION EPIDURAL; INFILTRATION; INTRACAUDAL; PERINEURAL at 12:13

## 2023-01-25 NOTE — PROGRESS NOTES
Ice pack given:    ___x__yes _____no    Discharge instructions signed by patient:    __x___yes _____no    Discharge instructions given to patient:    __x___yes _____no    Discharged via:    __x___ambulatory    _____wheelchair    _____stretcher    Stable on discharge:    __x___yes ____no  Patient would like results over the phone  Ok to leave a message  Patient made aware of the potential for bloody nipple discharge

## 2023-01-25 NOTE — DISCHARGE INSTR - OTHER ORDERS
POST LARGE CORE BREAST BIOPSY PATIENT INFORMATION      Place an ice pack inside your bra over the top of the dressing every hour for 20 minutes (20 minutes on, 60 minutes off)  Do this until bedtime  Do not shower or bathe until the following morning  After bathing, you may remove the bandaid  You may bathe your breast carefully with the steri-strips in place  Be careful not to loosen them  The steri-strips will fall off in 3-5 days  You may have mild discomfort, and you may have some bruising where the needle entered the skin  This should clear within 5-7 days  If you need medicine for discomfort, take acetaminophen products such as Tylenol  You may also take Advil or Motrin products  DO NOT use Aspirin for the first 24 hours  Do not participate in strenuous activities such as-tennis, aerobics, weight lifting, etc  for 24 hours  Refrain from swimming/soaking for 72 hours  Wearing a bra for sleeping may be more comfortable for the first 24-48 hours after your biopsy  Watch for continued bleeding, pain or fever over 101  If any of these symptoms occur, please contact our breast nurse navigator at the location where your biopsy was performed  During normal business hours (7:30am - 4:00pm) please call the nurse navigator at the site     where your procedure was performed:       [address]: [phone] or [phone] [address]: [phone] or [phone]     Joya Khan [address]: [address] [facility]: [address]: [phone]        After 4pm - please call your physician or go to the nearest Emergency Department location  The final results of your biopsy are usually available within one week

## 2023-01-25 NOTE — PROGRESS NOTES
Procedure type:    ___x__ultrasound guided _____stereotactic    Breast:    _____Left ___x__Right    Location: axilla    Needle: 16 gauge sherin    # of passes: 3    Clip: Hy[employer]romark Butterfly    Performed by: Dr Daina Astudillo held for 5 minutes by: Estee Cloud     Stermally Strips:    __x___yes _____no    Band aid:    __x___yes_____no    Tape and guaze:    _____yes __x___no    Tolerated procedure:    ___x__yes _____no

## 2023-01-25 NOTE — PROGRESS NOTES
Procedure type:    __x___ultrasound guided _____stereotactic    Breast:    _____Left __x___Right    Location: 9 o'clock 3 cmfn    Needle: 14 gauge sherin    # of passes: 3    Clip: Ribbon     Performed by: Dr Hernan Perez held for 5 minutes by: Sadaf Zacarias Strips:    __x___yes _____no    Band aid:    ___x__yes_____no    Tape and guaze:    _____yes ___x__no    Tolerated procedure:    ___x__yes _____no

## 2023-01-26 ENCOUNTER — ROUTINE PRENATAL (OUTPATIENT)
Dept: OBGYN CLINIC | Facility: CLINIC | Age: 33
End: 2023-01-26

## 2023-01-26 VITALS
TEMPERATURE: 96.8 F | DIASTOLIC BLOOD PRESSURE: 82 MMHG | HEART RATE: 97 BPM | SYSTOLIC BLOOD PRESSURE: 100 MMHG | WEIGHT: 139 LBS | BODY MASS INDEX: 23.13 KG/M2

## 2023-01-26 DIAGNOSIS — Z82.79 FAMILY HISTORY OF CLEFT PALATE: ICD-10-CM

## 2023-01-26 DIAGNOSIS — N61.1 BREAST ABSCESS: ICD-10-CM

## 2023-01-26 DIAGNOSIS — Z3A.18 18 WEEKS GESTATION OF PREGNANCY: ICD-10-CM

## 2023-01-26 DIAGNOSIS — Z34.80 SUPERVISION OF OTHER NORMAL PREGNANCY, ANTEPARTUM: ICD-10-CM

## 2023-01-26 DIAGNOSIS — Z34.92 SECOND TRIMESTER PREGNANCY: Primary | ICD-10-CM

## 2023-01-26 DIAGNOSIS — R87.810 PAP SMEAR OF CERVIX SHOWS HIGH RISK HPV PRESENT: ICD-10-CM

## 2023-01-26 DIAGNOSIS — F19.91 HISTORY OF RECREATIONAL DRUG USE: ICD-10-CM

## 2023-01-26 LAB
SL AMB  POCT GLUCOSE, UA: NEGATIVE
SL AMB POCT URINE PROTEIN: NEGATIVE

## 2023-01-26 NOTE — PROGRESS NOTES
Post procedure call completed    Bleeding: _____yes __X___no (pt denies)    Pain: _____yes ___X___no (pt with tenderness, used ice, took Tylenol with relief)    Redness/Swelling: ______yes ___X___no (pt with slight bruising)    Band aid removed: _____yes ___X__no (discussed removing when she showers)    Steri-Strips intact: ___X___yes _____no (discussed with patient to remove steri strips on Monday if they have not come off on their own)    Pt with no questions at this time, adv will call when results available, adv to call with any questions or concerns, has name/# for contact

## 2023-01-26 NOTE — PATIENT INSTRUCTIONS
Pregnancy at 15 to 18 Weeks   [address]:   What changes are happening in my body? Now that you are in your second trimester, you have more energy  You may also feel hungrier than usual  You may start to experience other symptoms, such as heartburn or dizziness  You may be gaining about ½ to 1 pound a week, and your pregnancy is beginning to show  You may need to start wearing maternity clothes  How do I care for myself at this stage of my pregnancy? Manage heartburn  by eating 4 or 5 small meals each day instead of large meals  Avoid spicy foods  Avoid eating right before bedtime  Manage nausea and vomiting  Avoid fatty and spicy foods  Eat small meals throughout the day instead of large meals  Kassie may help to decrease nausea  Ask your healthcare provider about other ways of decreasing nausea and vomiting  Eat a variety of healthy foods  Healthy foods include fruits, vegetables, whole-grain breads, low-fat dairy foods, beans, lean meats, and fish  Drink liquids as directed  Ask how much liquid to drink each day and which liquids are best for you  Limit caffeine to less than 200 milligrams each day  Limit your intake of fish to 2 servings each week  Choose fish low in mercury such as canned light tuna, shrimp, salmon, cod, or tilapia  Do not  eat fish high in mercury such as swordfish, tilefish, swati mackerel, and shark  Take prenatal vitamins as directed  Your need for certain vitamins and minerals, such as folic acid, increases during pregnancy  Prenatal vitamins provide some of the extra vitamins and minerals you need  Prenatal vitamins may also help to decrease the risk of certain birth defects  Do not smoke  Smoking increases your risk of a miscarriage and other health problems during your pregnancy  Smoking can cause your baby to be born too early or weigh less at birth  Ask your healthcare provider for information if you need help quitting      Do not drink alcohol  Alcohol passes from your body to your baby through the placenta  It can affect your baby's brain development and cause fetal alcohol syndrome (FAS)  FAS is a group of conditions that causes mental, behavior, and growth problems  Talk to your healthcare provider before you take any medicines  Many medicines may harm your baby if you take them when you are pregnant  Do not take any medicines, vitamins, herbs, or supplements without first talking to your healthcare provider  Never use illegal or street drugs (such as marijuana or cocaine) while you are pregnant  What are some safety tips during pregnancy? Avoid hot tubs and saunas  Do not use a hot tub or sauna while you are pregnant, especially during your first trimester  Hot tubs and saunas may raise your baby's temperature and increase the risk of birth defects  Avoid toxoplasmosis  This is an infection caused by eating raw meat or being around infected cat feces  It can cause birth defects, miscarriages, and other problems  Wash your hands after you touch raw meat  Make sure any meat is well-cooked before you eat it  Avoid raw eggs and unpasteurized milk  Use gloves or ask someone else to clean your cat's litter box while you are pregnant  What changes are happening with my baby? By 18 weeks, your baby may be about 6 inches long from the top of the head to the rump (baby's bottom)  Your baby may weigh about 11 ounces  You may be able to feel your baby's movement at about 18 weeks or later  The first movements may not be that noticeable  They may feel like a fluttering sensation  Your baby also makes sucking movements and can hear certain sounds  What do I need to know about prenatal care? During the first 28 weeks of your pregnancy, you will see your healthcare provider once a month  Your healthcare provider will check your blood pressure and weight   You may also need any of the following:  A urine test  may also be done to check for sugar and protein  These can be signs of gestational diabetes or infection  A blood test  may be done to check for anemia (low iron level)  Fundal height check  is a measurement of your uterus to check your baby's growth  This number is usually the same as the number of weeks that you have been pregnant  An ultrasound  may be done to check your baby's development  Your healthcare provider may be able to tell you what your baby's gender is during the ultrasound  Your baby's heart rate  will be checked  When should I seek immediate care? You have pain or cramping in your abdomen or low back  You have heavy vaginal bleeding or clotting  You pass material that looks like tissue or large clots  Collect the material and bring it with you  When should I call my doctor or obstetrician? You cannot keep food or drinks down, and you are losing weight  You have light bleeding  You have chills or a fever  You have vaginal itching, burning, or pain  You have yellow, green, white, or foul-smelling vaginal discharge  You have pain or burning when you urinate, less urine than usual, or pink or bloody urine  You have questions or concerns about your condition or care  CARE AGREEMENT:   You have the right to help plan your care  Learn about your health condition and how it may be treated  Discuss treatment options with your healthcare providers to decide what care you want to receive  You always have the right to refuse treatment  The above information is an  only  It is not intended as medical advice for individual conditions or treatments  Talk to your doctor, nurse or pharmacist before following any medical regimen to see if it is safe and effective for you  © Copyright [employer] 2022 Information is for End User's use only and may not be sold, redistributed or otherwise used for commercial purposes   All illustrations and images included in CareNotes® are the copyrighted property of A D A M , Inc  or [address] [place], [place] [address]keo [facility]

## 2023-01-27 ENCOUNTER — TELEPHONE (OUTPATIENT)
Dept: MAMMOGRAPHY | Facility: CLINIC | Age: 33
End: 2023-01-27

## 2023-01-27 ENCOUNTER — TELEPHONE (OUTPATIENT)
Dept: SURGICAL ONCOLOGY | Facility: CLINIC | Age: 33
End: 2023-01-27

## 2023-01-27 NOTE — TELEPHONE ENCOUNTER
Called patient to move up her consult appointment now that her breast biopsy results are finalized  Explained that Dr Chace Arredondo was currently out of the office for the next 2 weeks but she could be seen by WILLIAMS Lepe on Monday for a sooner consult  Informed patient that Dr Milly Varela would be available at that location on Monday as well if she needed to be seen by a breast surgeon  Patient verbalized understanding and was agreeable to this plan  She accepted a consult at 2:30 on 1/30 and verified appointment details  Patient was instructed to arrive early to complete her new patient paperwork

## 2023-01-30 ENCOUNTER — CONSULT (OUTPATIENT)
Dept: SURGICAL ONCOLOGY | Facility: CLINIC | Age: 33
End: 2023-01-30

## 2023-01-30 VITALS
TEMPERATURE: 98.5 F | BODY MASS INDEX: 23.19 KG/M2 | HEIGHT: 65 IN | SYSTOLIC BLOOD PRESSURE: 100 MMHG | DIASTOLIC BLOOD PRESSURE: 62 MMHG | WEIGHT: 139.2 LBS

## 2023-01-30 DIAGNOSIS — N61.1 ABSCESS OF RIGHT BREAST: Primary | ICD-10-CM

## 2023-01-30 RX ORDER — CEPHALEXIN 500 MG/1
500 CAPSULE ORAL EVERY 8 HOURS SCHEDULED
Qty: 21 CAPSULE | Refills: 0 | Status: SHIPPED | OUTPATIENT
Start: 2023-01-30 | End: 2023-02-06

## 2023-01-30 NOTE — PROGRESS NOTES
Surgical Oncology Follow Up       [facility]  [facility] [address]    Rahul Viveros  1990  [id number]      Chief Complaint   Patient presents with   • Consult       Assessment/Plan:  1  Abscess of right breast  - cephalexin (KEFLEX) 500 mg capsule; Take 1 capsule (500 mg total) by mouth every 8 (eight) hours for 7 days  Dispense: 21 capsule; Refill: 0  - 2 day f/u    Discussion/Summary: Patient is a 76-year-old female that is approximately 19 weeks pregnant that presents today for a consultation regarding a right breast abscess  This began approximately 2 weeks ago  She underwent a biopsy of the mass which was benign  The abscess has started to drain through the biopsy insertion site of the breast   An I&D was performed in the office today by Dr Jackson Bailey at the previously draining site  This area was then packed with quarter inch plain packing and a dry dressing was applied  Patient tolerated this well  I will renew patient's antibiotics  We will see her back in the office in 2 days for a recheck  She was instructed to contact us with concerns prior to that time  History of Present Illness: Patient is a 76-year-old female that is currently about 19 weeks pregnant  Approximately 2 weeks ago she noticed a right breast mass with associated erythema  She was evaluated by her gynecologist and was started on Keflex on 1/18/2023  She was sent for diagnostic imaging which revealed an 18 x 16 x 29 mm mass of the right breast as well as an abnormal axillary lymph node  She underwent biopsies of the breast mass and the lymph node which were benign  Pathology revealed an abscess and fat necrosis  Patient states that approximately 2 days ago she noticed some drainage of the right breast   She notes that her pain has improved since that time  Denies fevers or chills  She states that she has completed her course of antibiotics  She presents today for further treatment recommendations  Menarche age 14, 2 pregnancies, no live births to date  Currently 19 weeks pregnant  Denies family history of breast cancer  Her father was diagnosed with kidney cancer at age 61  She is not of Ashkenazi Judaism descent  Has never had breast issues in the past     -Interval History:     Review of Systems:  Review of Systems   Constitutional: Positive for fatigue  Negative for activity change, appetite change, chills, fever and unexpected weight change  Respiratory: Negative for cough and shortness of breath  Cardiovascular: Negative for chest pain  Gastrointestinal: Negative for abdominal pain, constipation, diarrhea, nausea and vomiting  Musculoskeletal: Negative for arthralgias, back pain, gait problem and myalgias  Skin: Negative for color change and rash  Allergic/Immunologic: Positive for food allergies  Neurological: Positive for light-headedness  Negative for dizziness and headaches  Hematological: Negative for adenopathy  Psychiatric/Behavioral: Negative for agitation and confusion  All other systems reviewed and are negative        Patient Active Problem List   Diagnosis   • 18 weeks gestation of pregnancy   • History of recreational drug use   • Supervision of other normal pregnancy, antepartum   • Family history of cleft palate   • Pap smear of cervix shows high risk HPV present   • Abscess of right breast     Past Medical History:   Diagnosis Date   • Breast discharge 1/15/23   • Breast mass 1/19/23   • Nasal septal perforation     secondary to cocaine use and digital trauma     Past Surgical History:   Procedure Laterality Date   • US GUIDED BREAST BIOPSY RIGHT COMPLETE Right 1/25/2023   • US GUIDED BREAST LYMPH NODE BIOPSY RIGHT Right 1/25/2023     Family History   Problem Relation Age of Onset   • No Known Problems Mother    • Kidney cancer Father    • No Known Problems Sister    • No Known Problems Maternal Grandmother    • No Known Problems Maternal Grandfather    • Lung cancer Paternal Grandmother    • Lung cancer Paternal Grandfather    • Stroke Paternal Grandfather         63's     Social History     Socioeconomic History   • Marital status: Single     Spouse name: Not on file   • Number of children: Not on file   • Years of education: Not on file   • Highest education level: Not on file   Occupational History   • Not on file   Tobacco Use   • Smoking status: Never   • Smokeless tobacco: Never   Vaping Use   • Vaping Use: Never used   Substance and Sexual Activity   • Alcohol use: Yes     Comment: OCCASIONAL   • Drug use: Not Currently     Types: Cocaine     Comment: [id number]   • Sexual activity: Yes     Partners: Male     Birth control/protection: None   Other Topics Concern   • Not on file   Social History Narrative    Consumes 1 cup of coffee per week     Social Determinants of Health     Financial Resource Strain: Not on file   Food Insecurity: Not on file   Transportation Needs: Not on file   Physical Activity: Not on file   Stress: Not on file   Social Connections: Not on file   Intimate Partner Violence: Not on file   Housing Stability: Not on file       Current Outpatient Medications:   •  ferrous sulfate 325 (65 Fe) mg tablet, Take 325 mg by mouth 2 (two) times a week, Disp: , Rfl:   •  Prenatal Vit-Fe Sulfate-FA-DHA (PRENATAL VITAMIN/MIN +DHA PO), Take by mouth, Disp: , Rfl:   Allergies   Allergen Reactions   • Latex Rash     Vitals:    01/30/23 1440   BP: 100/62   Temp: 98 5 °F (36 9 °C)       Physical Exam  Vitals reviewed  Constitutional:       Appearance: Normal appearance  HENT:      Head: Normocephalic and atraumatic  Pulmonary:      Effort: Pulmonary effort is normal    Chest:   Breasts:     Right: Swelling, skin change and tenderness present  No bleeding, inverted nipple or nipple discharge  Comments: Right lateral breast with erythema present   Right lateral breast biopsy site with thin yellow drainage present  Right axillary biopsy site healing well  I&D right breast performed by Dr Arlene Espino- to be documented separately  Wound was packed with 1/4" plain packing, antibiotic ointment, gauze, ABD, tape applied as dressing  Neurological:      General: No focal deficit present  Mental Status: She is alert and oriented to person, place, and time  Psychiatric:         Mood and Affect: Mood normal            Results:    Imaging  US guided breast biopsy right complete, US guided breast lymph node biopsy right    Addendum Date: 1/27/2023 Addendum:   ADDENDUM: PATHOLOGY RESULTS: A) Mass (Right; 9 o'clock; 3 cm from nipple) Benign finding: the pathology findings indicate benign breast tissue, fat necrosis, and abscess  B) Lymph Node (Right; Axilla) Benign finding: the pathology findings indicate fibroadipose tissue and lymphoid tissue  Findings are concordant with imaging  Clinical management is recommended  RECOMMENDATION:      - Clinical Management for the right breast  END ADDENDUM    Result Date: 1/25/2023  Narrative: DIAGNOSIS: Abnormal ultrasound of breast INDICATION: Vitaliy Matta is a 34 y o  female presenting for right breast and right axilla ultrasound-guided biopsy  Prior to the procedure, previous imaging was reviewed  The procedure was explained to the patient in detail  All questions were answered  Written and verbal informed consent was obtained  FINDINGS: RIGHT A) MASS US guided breast biopsy right complete: Images of the right breast mass at 9 o'clock, 3 cm from the nipple were obtained  The patient was placed supine on the biopsy table  A core needle biopsy was performed under ultrasound guidance using a lateral approach  The skin was prepped in the usual fashion  Anesthesia was administered using 5 mL of lidocaine 1%  A 14 G device was advanced in multiple passes  A ribbon clip was inserted into the target area   Post-placement ultrasound imaging demonstrates the clip was at the site  The patient tolerated the procedure well  There were no immediate complications  B) LYMPH NODE US guided breast biopsy right complete: Images of the right breast lymph node in the axilla region were obtained  The patient was placed supine on the biopsy table  A core needle biopsy was performed under ultrasound guidance using a lateral approach  The skin was prepped in the usual fashion  Anesthesia was administered using 5 mL of lidocaine 1%  A 16 G device was advanced in multiple passes  A clip was inserted into the target area  Butterfly clip Post-placement ultrasound imaging demonstrates the clip was at the site  The patient tolerated the procedure well  There were no immediate complications  Impression:  Technically successful right breast and axilla ultrasound-guided biopsy RECOMMENDATION:      - Waiting for pathology for the right breast  Workstation ID: [id number]     US breast right limited (diagnostic)    Result Date: 1/20/2023  Narrative: DIAGNOSIS: Breast abscess TECHNIQUE: Ultrasound of the right breast(s) was performed  COMPARISONS: None available  RELEVANT HISTORY: Family Breast Cancer History: No known family history of breast cancer  Family Medical History: No known relevant family medical history  Personal History: No known relevant hormone history  No known relevant surgical history  No known relevant medical history  RISK ASSESSMENT: 5 Year Tyrer-Cuzick: 0 28 % 10 Year Tyrer-Cuzick: 0 87 % Lifetime Tyrer-Cuzick: 15 07 % INDICATION: Kristian Giles is a 34 y o  female presenting for right breast lump with associated pain and skin redness  FINDINGS: RIGHT A) MASS: There is an 18 mm x 16 mm x 29 mm irregularly shaped, heterogeneous mass with circumscribed margins seen in the right breast at 9 o'clock, 3 cm from the nipple  B) LYMPH NODE: There is an abnormal appearing lymph node in the right axilla       Impression:  Irregular heterogeneous mass in the 9 o'clock position right breast, 3 cm from the nipple  Right ultrasound-guided core needle biopsy is recommended  Abnormal appearing lymph node in the right axilla  Right ultrasound-guided core needle biopsy is recommended  The findings and recommendations were discussed with the patient at the time of this exam  ASSESSMENT/BI-RADS CATEGORY: Right: 4 - Suspicious Overall: 4 - Suspicious RECOMMENDATION:      - Ultrasound-guided breast biopsy for the right breast  Workstation ID: [id number]      I reviewed the above imaging data  Advance Care Planning/Advance Directives:  Discussed disease status and treatment goals with the patient

## 2023-02-01 ENCOUNTER — OFFICE VISIT (OUTPATIENT)
Dept: SURGICAL ONCOLOGY | Facility: CLINIC | Age: 33
End: 2023-02-01

## 2023-02-01 VITALS
WEIGHT: 139 LBS | BODY MASS INDEX: 23.16 KG/M2 | OXYGEN SATURATION: 97 % | DIASTOLIC BLOOD PRESSURE: 60 MMHG | SYSTOLIC BLOOD PRESSURE: 110 MMHG | TEMPERATURE: 98.5 F | RESPIRATION RATE: 16 BRPM | HEART RATE: 69 BPM | HEIGHT: 65 IN

## 2023-02-01 DIAGNOSIS — N61.1 ABSCESS OF RIGHT BREAST: Primary | ICD-10-CM

## 2023-02-01 NOTE — PROGRESS NOTES
Surgical Oncology Follow Up       [facility]  [facility] [address]    Chun Torres  1990  [id number]      Chief Complaint   Patient presents with   • Follow-up       Assessment/Plan:  1  Abscess of right breast  -  Continue abx, warm compresses  - 2 day f/u visit      Discussion/Summary: Patient is a 34year old female that is 19 weeks pregnant for a follow up visit for a right breast abscess  She underwent an I&D in the office two days ago  She is taking oral antibiotics  Packing was removed and wound care performed again today  She is improving clinically with less erythema, increased softness of the breast and she reports decreased tenderness  Will continue with current plan  I will see her back in 2 days for wound care  She was instructed to contact me with changes or concerns in the interim  History of Present Illness:     -Interval History: Patient presents for a short term follow up visit  She states her tenderness has improved  She is taking the antibiotics  Review of Systems:  Review of Systems   Constitutional: Negative for chills, fatigue and fever  Respiratory: Negative for cough and shortness of breath  Cardiovascular: Negative for chest pain  Skin: Positive for color change and wound  Hematological: Negative for adenopathy  Psychiatric/Behavioral: Negative for confusion         Patient Active Problem List   Diagnosis   • 18 weeks gestation of pregnancy   • History of recreational drug use   • Supervision of other normal pregnancy, antepartum   • Family history of cleft palate   • Pap smear of cervix shows high risk HPV present   • Abscess of right breast     Past Medical History:   Diagnosis Date   • Breast discharge 1/15/23   • Breast mass 1/19/23   • Nasal septal perforation     secondary to cocaine use and digital trauma     Past Surgical History:   Procedure Laterality Date   • US GUIDED BREAST BIOPSY RIGHT COMPLETE Right 1/25/2023   • US GUIDED BREAST LYMPH NODE BIOPSY RIGHT Right 1/25/2023     Family History   Problem Relation Age of Onset   • No Known Problems Mother    • Kidney cancer Father    • No Known Problems Sister    • No Known Problems Maternal Grandmother    • No Known Problems Maternal Grandfather    • Lung cancer Paternal Grandmother    • Lung cancer Paternal Grandfather    • Stroke Paternal Grandfather         63's     Social History     Socioeconomic History   • Marital status: Single     Spouse name: Not on file   • Number of children: Not on file   • Years of education: Not on file   • Highest education level: Not on file   Occupational History   • Not on file   Tobacco Use   • Smoking status: Never   • Smokeless tobacco: Never   Vaping Use   • Vaping Use: Never used   Substance and Sexual Activity   • Alcohol use: Yes     Comment: OCCASIONAL   • Drug use: Not Currently     Types: Cocaine     Comment: [id number]   • Sexual activity: Yes     Partners: Male     Birth control/protection: None   Other Topics Concern   • Not on file   Social History Narrative    Consumes 1 cup of coffee per week     Social Determinants of Health     Financial Resource Strain: Not on file   Food Insecurity: Not on file   Transportation Needs: Not on file   Physical Activity: Not on file   Stress: Not on file   Social Connections: Not on file   Intimate Partner Violence: Not on file   Housing Stability: Not on file       Current Outpatient Medications:   •  cephalexin (KEFLEX) 500 mg capsule, Take 1 capsule (500 mg total) by mouth every 8 (eight) hours for 7 days, Disp: 21 capsule, Rfl: 0  •  ferrous sulfate 325 (65 Fe) mg tablet, Take 325 mg by mouth 2 (two) times a week, Disp: , Rfl:   •  Prenatal Vit-Fe Sulfate-FA-DHA (PRENATAL VITAMIN/MIN +DHA PO), Take by mouth, Disp: , Rfl:   Allergies   Allergen Reactions   • Latex Rash     Vitals:    02/01/23 0920   BP: 110/60   Pulse: 69   Resp: 16   Temp: 98 5 °F (36 9 °C)   SpO2: 97%       Physical Exam  Vitals reviewed  Constitutional:       Appearance: Normal appearance  HENT:      Head: Normocephalic  Pulmonary:      Effort: Pulmonary effort is normal    Chest:      Comments: Right breast with decreased erythema  Packing removed and wound was irrigated with NSS and repacked with 1/4" plain packing  Topical antibiotic ointment and gauze/tape applied  Minimal drainage noted  Neurological:      General: No focal deficit present  Mental Status: She is alert and oriented to person, place, and time  Psychiatric:         Mood and Affect: Mood normal            Advance Care Planning/Advance Directives:  Discussed disease status and treatment goals with the patient

## 2023-02-03 ENCOUNTER — OFFICE VISIT (OUTPATIENT)
Dept: SURGICAL ONCOLOGY | Facility: CLINIC | Age: 33
End: 2023-02-03

## 2023-02-03 ENCOUNTER — ROUTINE PRENATAL (OUTPATIENT)
Dept: OBGYN CLINIC | Facility: CLINIC | Age: 33
End: 2023-02-03

## 2023-02-03 VITALS
BODY MASS INDEX: 23.16 KG/M2 | SYSTOLIC BLOOD PRESSURE: 110 MMHG | RESPIRATION RATE: 16 BRPM | HEART RATE: 69 BPM | DIASTOLIC BLOOD PRESSURE: 60 MMHG | OXYGEN SATURATION: 97 % | TEMPERATURE: 98.5 F | WEIGHT: 139 LBS | HEIGHT: 65 IN

## 2023-02-03 VITALS
WEIGHT: 138 LBS | HEART RATE: 87 BPM | DIASTOLIC BLOOD PRESSURE: 74 MMHG | SYSTOLIC BLOOD PRESSURE: 108 MMHG | BODY MASS INDEX: 22.99 KG/M2 | HEIGHT: 65 IN

## 2023-02-03 DIAGNOSIS — Z34.80 SUPERVISION OF OTHER NORMAL PREGNANCY, ANTEPARTUM: ICD-10-CM

## 2023-02-03 DIAGNOSIS — Z3A.19 19 WEEKS GESTATION OF PREGNANCY: ICD-10-CM

## 2023-02-03 DIAGNOSIS — N61.1 ABSCESS OF RIGHT BREAST: ICD-10-CM

## 2023-02-03 DIAGNOSIS — Z82.79 FAMILY HISTORY OF CLEFT PALATE: ICD-10-CM

## 2023-02-03 DIAGNOSIS — F19.91 HISTORY OF RECREATIONAL DRUG USE: Primary | ICD-10-CM

## 2023-02-03 DIAGNOSIS — N61.1 ABSCESS OF RIGHT BREAST: Primary | ICD-10-CM

## 2023-02-03 LAB
SL AMB  POCT GLUCOSE, UA: NEGATIVE
SL AMB POCT URINE PROTEIN: NEGATIVE

## 2023-02-03 NOTE — PROGRESS NOTES
S: 34 y o   19w6d here for routine prenatal visit  Chief Complaint   Patient presents with   • Routine Prenatal Visit         OB complaints:  Contractions: no  Leakage: no  Bleeding: no  Fetal movement: yes      O:  /74   Pulse 87   Ht 5' 5" (1 651 m)   Wt 62 6 kg (138 lb)   LMP 2022 (Approximate)   BMI 22 96 kg/m²       Review of Systems   Constitutional: Negative for chills and fever  Eyes: Negative for visual disturbance  Respiratory: Negative for chest tightness and shortness of breath  Cardiovascular: Negative for chest pain  Gastrointestinal: Negative for abdominal pain, diarrhea, nausea and vomiting  Genitourinary: Negative for pelvic pain and vaginal bleeding  As noted in HPI   Skin: Negative for rash  Neurological: Negative for headaches  All other systems reviewed and are negative  Physical Exam  Constitutional:       General: She is not in acute distress  Appearance: Normal appearance  Genitourinary:      Genitourinary Comments: Right breast with healing I&D site and packing in place, still some surrounding erythema but improved compared to previous documentation    Breasts:     Right: Skin change and tenderness present  HENT:      Head: Normocephalic and atraumatic  Cardiovascular:      Rate and Rhythm: Normal rate  Pulmonary:      Effort: Pulmonary effort is normal  No respiratory distress  Abdominal:      General: There is no distension  Palpations: Abdomen is soft  Tenderness: There is no abdominal tenderness  There is no guarding or rebound  Comments: gravid   Neurological:      General: No focal deficit present  Mental Status: She is alert  Psychiatric:         Mood and Affect: Mood normal          Behavior: Behavior normal    Vitals and nursing note reviewed                Fetal Heart Rate: 134    Pregravid Weight/BMI: 50 8 kg (112 lb) (BMI 18 64)  Current Weight: 62 6 kg (138 lb)   Total Weight Gain: 11 8 kg (26 lb)     Pre- Vitals    Flowsheet Row Most Recent Value   Prenatal Assessment    Fetal Heart Rate 134   Movement Present   Prenatal Vitals    Blood Pressure 108/74   Weight - Scale 62 6 kg (138 lb)   Urine Albumin/Glucose    Dilation/Effacement/Station    Vaginal Drainage    Edema             Results for orders placed or performed in visit on 23   POCT urine dip   Result Value Ref Range    POCT URINE PROTEIN negative     GLUCOSE, UA negative          Problem List        Other    19 weeks gestation of pregnancy    History of recreational drug use    Overview     Hx of cocaine use  No use since 2022    Pt attends weekly group therapy    Urine drug screen ordered - not done with NOB labs          Supervision of other normal pregnancy, antepartum    Overview     Declines flu vaccine  covid vaccine completed x 3  NIPT, msAFP low risk          Current Assessment & Plan     Anatomy US is scheduled  OB precautions reviewed         Family history of cleft palate    Overview     FOB's sister         Pap smear of cervix shows high risk HPV present    Overview     2022 pap: negative, HPV HR +, HPV 16/18 negative  Per ASCCP guidelines pap due 2023__         Abscess of right breast    Overview     S/p I&D and biopsy at 19 weeks          Current Assessment & Plan     Sees breast surgeon's office, most recently today, packing replaced  Going back in 1 week  Completing keflex x 7 day course                               Saira Hartman MD  2/3/2023  3:38 PM

## 2023-02-03 NOTE — ASSESSMENT & PLAN NOTE
Sees breast surgeon's office, most recently today, packing replaced  Going back in 1 week  Completing keflex x 7 day course

## 2023-02-03 NOTE — PROGRESS NOTES
Surgical Oncology Follow Up       [facility]  [facility] [id number]    Anshu Wiggins  1990  [id number]      Chief Complaint   Patient presents with   • Other     Office visit       Assessment/Plan:  1  Abscess of right breast  - 1 week follow up      Discussion/Summary:  Patient is a 34year old female that is about 20 weeks pregnant that presents for a follow up visit for a right breast abscess  She underwent an I&D in the office 5 days ago  She is taking oral antibiotics  Packing was removed and wound care performed again today  She is improving clinically with less erythema she reports decreased tenderness  There is still some inflammation/firmness of the right lateral breast  Will continue with current plan  She states she is out of town from Monday to Friday morning next week  She was instructed to remove the packing in 2 days and continue to keep the wound clean and dry, encouraged her to apply topical antibiotic ointment  Also encouraged warm compresses  I will see her back in one week for reevaluation  She was instructed to call with changes or concerns prior to that time  History of Present Illness:     -Interval History: Patient presents today for a follow up visit for a right breast abscess  She reports continued improvement  Continues on antibiotics  Denies fevers/chills  Review of Systems:  Review of Systems   Constitutional: Negative for chills, fatigue and fever  Respiratory: Negative for cough and shortness of breath  Cardiovascular: Negative for chest pain  Hematological: Negative for adenopathy  Psychiatric/Behavioral: Negative for confusion         Patient Active Problem List   Diagnosis   • 18 weeks gestation of pregnancy   • History of recreational drug use   • Supervision of other normal pregnancy, antepartum   • Family history of cleft palate   • Pap smear of cervix shows high risk HPV present • Abscess of right breast     Past Medical History:   Diagnosis Date   • Breast discharge 1/15/23   • Breast mass 1/19/23   • Nasal septal perforation     secondary to cocaine use and digital trauma     Past Surgical History:   Procedure Laterality Date   • US GUIDED BREAST BIOPSY RIGHT COMPLETE Right 1/25/2023   • US GUIDED BREAST LYMPH NODE BIOPSY RIGHT Right 1/25/2023     Family History   Problem Relation Age of Onset   • No Known Problems Mother    • Kidney cancer Father    • No Known Problems Sister    • No Known Problems Maternal Grandmother    • No Known Problems Maternal Grandfather    • Lung cancer Paternal Grandmother    • Lung cancer Paternal Grandfather    • Stroke Paternal Grandfather         63's     Social History     Socioeconomic History   • Marital status: Single     Spouse name: Not on file   • Number of children: Not on file   • Years of education: Not on file   • Highest education level: Not on file   Occupational History   • Not on file   Tobacco Use   • Smoking status: Never   • Smokeless tobacco: Never   Vaping Use   • Vaping Use: Never used   Substance and Sexual Activity   • Alcohol use: Yes     Comment: OCCASIONAL   • Drug use: Not Currently     Types: Cocaine     Comment: [id number]   • Sexual activity: Yes     Partners: Male     Birth control/protection: None   Other Topics Concern   • Not on file   Social History Narrative    Consumes 1 cup of coffee per week     Social Determinants of Health     Financial Resource Strain: Not on file   Food Insecurity: Not on file   Transportation Needs: Not on file   Physical Activity: Not on file   Stress: Not on file   Social Connections: Not on file   Intimate Partner Violence: Not on file   Housing Stability: Not on file       Current Outpatient Medications:   •  cephalexin (KEFLEX) 500 mg capsule, Take 1 capsule (500 mg total) by mouth every 8 (eight) hours for 7 days, Disp: 21 capsule, Rfl: 0  •  ferrous sulfate 325 (65 Fe) mg tablet, Take 325 mg by mouth 2 (two) times a week, Disp: , Rfl:   •  Prenatal Vit-Fe Sulfate-FA-DHA (PRENATAL VITAMIN/MIN +DHA PO), Take by mouth, Disp: , Rfl:   Allergies   Allergen Reactions   • Latex Rash     Vitals:    02/03/23 0832   BP: 110/60   Pulse: 69   Resp: 16   Temp: 98 5 °F (36 9 °C)   SpO2: 97%       Physical Exam  Vitals reviewed  Constitutional:       Appearance: Normal appearance  HENT:      Head: Normocephalic  Pulmonary:      Effort: Pulmonary effort is normal    Chest:      Comments: Right breast continues to improve  There is decreased erythema but still some firmness at the lateral aspect of the breast  Overall breast is soft  Packing removed and wound was irrigated with NSS and repacked with 1/4" plain packing  Dry dressing applied  Neurological:      General: No focal deficit present  Mental Status: She is alert and oriented to person, place, and time  Psychiatric:         Mood and Affect: Mood normal            Advance Care Planning/Advance Directives:  Discussed disease status and treatment goals with the patient

## 2023-02-09 ENCOUNTER — ROUTINE PRENATAL (OUTPATIENT)
Dept: PERINATAL CARE | Facility: OTHER | Age: 33
End: 2023-02-09

## 2023-02-09 VITALS
SYSTOLIC BLOOD PRESSURE: 112 MMHG | BODY MASS INDEX: 23.76 KG/M2 | DIASTOLIC BLOOD PRESSURE: 60 MMHG | WEIGHT: 142.6 LBS | HEIGHT: 65 IN | HEART RATE: 100 BPM

## 2023-02-09 DIAGNOSIS — Z36.86 ENCOUNTER FOR ANTENATAL SCREENING FOR CERVICAL LENGTH: ICD-10-CM

## 2023-02-09 DIAGNOSIS — Z3A.20 20 WEEKS GESTATION OF PREGNANCY: ICD-10-CM

## 2023-02-09 DIAGNOSIS — O35.EXX0 PYELECTASIS OF FETUS ON PRENATAL ULTRASOUND: Primary | ICD-10-CM

## 2023-02-09 DIAGNOSIS — Z36.3 ENCOUNTER FOR ANTENATAL SCREENING FOR MALFORMATIONS: ICD-10-CM

## 2023-02-09 NOTE — PROGRESS NOTES
Ultrasound Probe Disinfection    A transvaginal ultrasound was performed  Prior to use, disinfection was performed with High Level Disinfection Process ([employer])  Probe serial number F3: [id number] was used        [facility]  02/09/23  2:49 PM

## 2023-02-09 NOTE — PATIENT INSTRUCTIONS
Thank you for choosing us for your  care today  If you have any questions about your ultrasound or care, please do not hesitate to contact us or your primary obstetrician  Some general instructions for your pregnancy are:    Exercise: Aim for 22 minutes per day (150 minutes per week) of regular exercise  Walking is great! Nutrition: Choose healthy sources of calcium, iron, and protein  Learn about Preeclampsia: preeclampsia is a common, serious high blood pressure complication in pregnancy  A blood pressure of 033NCPZ (systolic or top number) or 49PFMP (diastolic or bottom number) is not normal and needs evaluation by your doctor  Aspirin is sometimes prescribed in early pregnancy to prevent preeclampsia in women with risk factors - ask your obstetrician if you should be on this medication  For more resources, visit:  https://mothertobaby org/fact-sheets/low-dose-aspirin/  [employer]anner[employer] com cy  https://www highriskpregnancyinfo org/preeclampsia  If you smoke, try to reduce how many cigarettes you smoke or try to quit completely  Do not vape  Other warning signs to watch out for in pregnancy or postpartum: chest pain, obstructed breathing or shortness of breath, seizures, thoughts of hurting yourself or your baby, bleeding, a painful or swollen leg, fever, or headache (see [facility] POST-BIRTH Warning Signs campaign)  If these happen call 911  Itching is also not normal in pregnancy and if you experience this, especially over your hands and feet, potentially worse at night, notify your doctors

## 2023-02-09 NOTE — LETTER
February 9, 2023     Sanna Rai, [address] [place] [id number] [address]    Patient: Gulshan Jim   YOB: 1990   Date of Visit: 2/9/2023       Dear Dr Melissa Vines: Thank you for referring Gulshan Jim to me for evaluation  Below are my notes for this consultation  If you have questions, please do not hesitate to call me  I look forward to following your patient along with you  Sincerely,        Carl Martínez MD        CC: No Recipients  Carl Martínez MD  2/9/2023  4:31 PM  Sign when Signing Visit  Gulshan Jim has no complaints today  She reports regular fetal movements and does not report any problems  She is here today at [facility] for an ultrasound for anatomy  Both MSAFP, NIPT, CF screen, SMA screening and hemoglobin electrophoresis were normal    Problem list:  1  FOB's sister with a cleft lip    Ultrasound findings: The ultrasound today shows normal interval fetal growth and fluid  The cervix appears normal   Bilateral minimal pyelectasis of 4 mm is noted  The bladder appears normal   There is no evidence of ureteromegaly or calyceal dilation  Renal parenchyma appears normal     Pregnancy ultrasound has limitations and is unable to detect all forms of fetal congenital abnormalities  The inaccuracy in the EFW can be off by 1 lb either way in the third trimester  Specific counseling was provided on the following problems:  1   2 - 3% of babies have pyelectasis ( dilation of the renal pelvis) on US which has resulted from a blockage along the urinary tract  Renal pelvis size less then 4 mm in the second trimester and less then 7 mm after 32 weeks is a normal variant as long as there is no calyceal dilation, ureteromegaly, oligohydramnios, and the bladder and renal parenchyma appear normal  It is found more commonly in males  Follow up recommended:   1   Recommend a follow-up ultrasound at 32 weeks for fetal growth and to review the fetal kidneys  Pre visit time reviewing her records   10 minutes  Face to face time 5 minutes  Post visit time on documentation of note, updating her problem list, adding orders and prescriptions 5 minutes  Procedures that were completed today were charged separately  The level of decision making was low level complexity      Jovi Marcus MD

## 2023-02-09 NOTE — PROGRESS NOTES
Salo Roberson has no complaints today  She reports regular fetal movements and does not report any problems  She is here today at [facility] for an ultrasound for anatomy  Both MSAFP, NIPT, CF screen, SMA screening and hemoglobin electrophoresis were normal    Problem list:  1  FOB's sister with a cleft lip    Ultrasound findings: The ultrasound today shows normal interval fetal growth and fluid  The cervix appears normal   Bilateral minimal pyelectasis of 4 mm is noted  The bladder appears normal   There is no evidence of ureteromegaly or calyceal dilation  Renal parenchyma appears normal     Pregnancy ultrasound has limitations and is unable to detect all forms of fetal congenital abnormalities  The inaccuracy in the EFW can be off by 1 lb either way in the third trimester  Specific counseling was provided on the following problems:  1   2 - 3% of babies have pyelectasis ( dilation of the renal pelvis) on US which has resulted from a blockage along the urinary tract  Renal pelvis size less then 4 mm in the second trimester and less then 7 mm after 32 weeks is a normal variant as long as there is no calyceal dilation, ureteromegaly, oligohydramnios, and the bladder and renal parenchyma appear normal  It is found more commonly in males  Follow up recommended:   1  Recommend a follow-up ultrasound at 32 weeks for fetal growth and to review the fetal kidneys  Pre visit time reviewing her records   10 minutes  Face to face time 5 minutes  Post visit time on documentation of note, updating her problem list, adding orders and prescriptions 5 minutes  Procedures that were completed today were charged separately  The level of decision making was low level luis antonio Duval MD

## 2023-02-10 ENCOUNTER — OFFICE VISIT (OUTPATIENT)
Dept: SURGICAL ONCOLOGY | Facility: CLINIC | Age: 33
End: 2023-02-10

## 2023-02-10 VITALS
TEMPERATURE: 98.6 F | OXYGEN SATURATION: 99 % | DIASTOLIC BLOOD PRESSURE: 74 MMHG | HEART RATE: 86 BPM | BODY MASS INDEX: 23.49 KG/M2 | WEIGHT: 141 LBS | SYSTOLIC BLOOD PRESSURE: 116 MMHG | HEIGHT: 65 IN | RESPIRATION RATE: 16 BRPM

## 2023-02-10 DIAGNOSIS — N61.1 ABSCESS OF RIGHT BREAST: Primary | ICD-10-CM

## 2023-02-10 NOTE — PROGRESS NOTES
Surgical Oncology Follow Up       [facility]  [facility] [id number]    Lesia Moran  1990  [id number]      Chief Complaint   Patient presents with   • Office visit       Assessment/Plan:  1  Abscess of right breast  - Warm compresses  Call with fevers/chills  - RTO Monday for reevaluation    Discussion/Summary: Patient is a 34year old female that is 20 weeks pregnant that presents for a follow up visit for a right breast abscess  She underwent a biopsy which was benign and then an I&D  We had been packing the wound  Unfortunately the patient needed to travel out of town this week and the wound was not able to be repacked  She presents for reevaluation again today with a new firmness at the more medial aspect of the right lateral breast  The minimal erythema is stable  No fevers or chills  She is now off of antibiotics after completing two rounds  I've recommended she apply warm compresses over the next two days and present for reevaluation on Monday  She was instructed to call with worsening symptoms over the weekend  History of Present Illness:     -Interval History: Patient returns for a follow up visit for a right breast abscess  She had been out of town this week and states she returned this morning  She removed the packing about 5 days ago  Reports new firmness of right breast  Denies fevers, chills  Review of Systems:  Review of Systems   Constitutional: Negative for chills, fatigue and fever  Respiratory: Negative for cough and shortness of breath  Cardiovascular: Negative for chest pain  Hematological: Negative for adenopathy  Psychiatric/Behavioral: Negative for confusion         Patient Active Problem List   Diagnosis   • 19 weeks gestation of pregnancy   • History of recreational drug use   • Supervision of other normal pregnancy, antepartum   • Family history of cleft palate   • Pap smear of cervix shows high risk HPV present   • Abscess of right breast   • Pyelectasis of fetus on prenatal ultrasound     Past Medical History:   Diagnosis Date   • Abnormal Pap smear of cervix 12/30   • Breast discharge 1/15/23   • Breast mass 1/19/23   • Nasal septal perforation     secondary to cocaine use and digital trauma     Past Surgical History:   Procedure Laterality Date   • BREAST BIOPSY  1/25   • US GUIDED BREAST BIOPSY RIGHT COMPLETE Right 01/25/2023   • US GUIDED BREAST LYMPH NODE BIOPSY RIGHT Right 01/25/2023     Family History   Problem Relation Age of Onset   • No Known Problems Mother    • Kidney cancer Father    • No Known Problems Sister    • No Known Problems Maternal Grandmother    • No Known Problems Maternal Grandfather    • Lung cancer Paternal Grandmother    • Lung cancer Paternal Grandfather    • Stroke Paternal Grandfather         63's     Social History     Socioeconomic History   • Marital status: Single     Spouse name: Not on file   • Number of children: Not on file   • Years of education: Not on file   • Highest education level: Not on file   Occupational History   • Not on file   Tobacco Use   • Smoking status: Never   • Smokeless tobacco: Never   Vaping Use   • Vaping Use: Never used   Substance and Sexual Activity   • Alcohol use: Yes     Comment: OCCASIONAL   • Drug use: Yes     Types: Cocaine     Comment: past usage ([id number])   • Sexual activity: Yes     Partners: Male     Birth control/protection: None   Other Topics Concern   • Not on file   Social History Narrative    Consumes 1 cup of coffee per week     Social Determinants of Health     Financial Resource Strain: Not on file   Food Insecurity: Not on file   Transportation Needs: Not on file   Physical Activity: Not on file   Stress: Not on file   Social Connections: Not on file   Intimate Partner Violence: Not on file   Housing Stability: Not on file       Current Outpatient Medications:   •  ferrous sulfate 325 (65 Fe) mg tablet, Take 325 mg by mouth 2 (two) times a week, Disp: , Rfl:   •  Prenatal Vit-Fe Sulfate-FA-DHA (PRENATAL VITAMIN/MIN +DHA PO), Take by mouth, Disp: , Rfl:   Allergies   Allergen Reactions   • Latex Rash     Vitals:    02/10/23 1522   BP: 116/74   Pulse: 86   Resp: 16   Temp: 98 6 °F (37 °C)   SpO2: 99%       Physical Exam  Vitals reviewed  Constitutional:       Appearance: Normal appearance  HENT:      Head: Normocephalic and atraumatic  Chest:          Comments: Right breast with new firmness at the lateral aspect of the right areola  Mild erythema remaining- this is stable  The more lateral aspect of the right breast is softer (in area of previous I&D)  This site has completely healed  Neurological:      General: No focal deficit present  Mental Status: She is alert and oriented to person, place, and time  Psychiatric:         Mood and Affect: Mood normal            Results:    Imaging  US guided breast biopsy right complete, US guided breast lymph node biopsy right    Addendum Date: 1/27/2023 Addendum:   ADDENDUM: PATHOLOGY RESULTS: A) Mass (Right; 9 o'clock; 3 cm from nipple) Benign finding: the pathology findings indicate benign breast tissue, fat necrosis, and abscess  B) Lymph Node (Right; Axilla) Benign finding: the pathology findings indicate fibroadipose tissue and lymphoid tissue  Findings are concordant with imaging  Clinical management is recommended  RECOMMENDATION:      - Clinical Management for the right breast  END ADDENDUM    Result Date: 1/25/2023  Narrative: DIAGNOSIS: Abnormal ultrasound of breast INDICATION: Froy Chi is a 34 y o  female presenting for right breast and right axilla ultrasound-guided biopsy  Prior to the procedure, previous imaging was reviewed  The procedure was explained to the patient in detail  All questions were answered  Written and verbal informed consent was obtained   FINDINGS: RIGHT A) MASS US guided breast biopsy right complete: Images of the right breast mass at 9 o'clock, 3 cm from the nipple were obtained  The patient was placed supine on the biopsy table  A core needle biopsy was performed under ultrasound guidance using a lateral approach  The skin was prepped in the usual fashion  Anesthesia was administered using 5 mL of lidocaine 1%  A 14 G device was advanced in multiple passes  A ribbon clip was inserted into the target area  Post-placement ultrasound imaging demonstrates the clip was at the site  The patient tolerated the procedure well  There were no immediate complications  B) LYMPH NODE US guided breast biopsy right complete: Images of the right breast lymph node in the axilla region were obtained  The patient was placed supine on the biopsy table  A core needle biopsy was performed under ultrasound guidance using a lateral approach  The skin was prepped in the usual fashion  Anesthesia was administered using 5 mL of lidocaine 1%  A 16 G device was advanced in multiple passes  A clip was inserted into the target area  Butterfly clip Post-placement ultrasound imaging demonstrates the clip was at the site  The patient tolerated the procedure well  There were no immediate complications  Impression:  Technically successful right breast and axilla ultrasound-guided biopsy RECOMMENDATION:      - Waiting for pathology for the right breast  Workstation ID: [id number]     US breast right limited (diagnostic)    Result Date: 1/20/2023  Narrative: DIAGNOSIS: Breast abscess TECHNIQUE: Ultrasound of the right breast(s) was performed  COMPARISONS: None available  RELEVANT HISTORY: Family Breast Cancer History: No known family history of breast cancer  Family Medical History: No known relevant family medical history  Personal History: No known relevant hormone history  No known relevant surgical history  No known relevant medical history   RISK ASSESSMENT: 5 Year Tyrer-Cuzick: 0 28 % 10 Year Tyrer-Cuzick: 0 87 % Lifetime Tyrer-Cuzick: 15 07 % INDICATION: Jocelyn Nur is a 34 y o  female presenting for right breast lump with associated pain and skin redness  FINDINGS: RIGHT A) MASS: There is an 18 mm x 16 mm x 29 mm irregularly shaped, heterogeneous mass with circumscribed margins seen in the right breast at 9 o'clock, 3 cm from the nipple  B) LYMPH NODE: There is an abnormal appearing lymph node in the right axilla  Impression:  Irregular heterogeneous mass in the 9 o'clock position right breast, 3 cm from the nipple  Right ultrasound-guided core needle biopsy is recommended  Abnormal appearing lymph node in the right axilla  Right ultrasound-guided core needle biopsy is recommended  The findings and recommendations were discussed with the patient at the time of this exam  ASSESSMENT/BI-RADS CATEGORY: Right: 4 - Suspicious Overall: 4 - Suspicious RECOMMENDATION:      - Ultrasound-guided breast biopsy for the right breast  Workstation ID: [id number]      I reviewed the above imaging data  Advance Care Planning/Advance Directives:  Discussed disease status and treatment goals with the patient

## 2023-02-13 ENCOUNTER — TELEPHONE (OUTPATIENT)
Dept: HEMATOLOGY ONCOLOGY | Facility: CLINIC | Age: 33
End: 2023-02-13

## 2023-02-13 NOTE — TELEPHONE ENCOUNTER
Appointment Cancellation Or Reschedule     Person calling in Patient   If someone other than patient calling, are they listed on the communication consent form? N/A   Provider Andrey José, [facility]    Office Visit Date and Time  02/13/23 9AM   Office Visit Location [place]   Did patient want to reschedule their office appointment? If so, when was it scheduled to? No, Patient will call back to reschedule   Did you have STAR scheduled for this appointment? No   Do you need STAR set up for your new appointment? If yes, please [facility] pool for STAR rescheduling N/A   Is this patient calling to reschedule an infusion appointment? No   When is their next infusion appointment? N/A   Is this patient a Chemo patient? No   Reason for Cancellation or Reschedule Patient is covering for her boss at work and is unable to leave for this appointment  She has had to miss several days of work for her follow ups and is making efforts to accommodate work as well as her appointments  Was No show policy reviewed with patient if patient canceling within 24 hours? Yes     If the patient is cancelling an appointment and needs their STAR Transport cancelled, please route to [facility] [address]  If the patient is a treatment patient, please route this to the office nurse  If the patient is not on treatment, please route to the Clerical pool based on location  If the patient is a surgical oncology patient, please route to surg/onc clinical pool  Route message as high priority if same day cancellation

## 2023-02-14 NOTE — TELEPHONE ENCOUNTER
Called patient and left message if patient would like to reschedule appt at this time that was cancelled yesterday on 2/13/23 with Frederick Christine  In message, left information to reschedule appt and to have scheduled on same day when Dr Jackson Bailey is in the office

## 2023-03-01 PROBLEM — Z3A.23 23 WEEKS GESTATION OF PREGNANCY: Status: ACTIVE | Noted: 2022-12-08

## 2023-03-02 NOTE — PROGRESS NOTES
OB/GYN  PN Visit  Cuco Valerio  [id number]  3/3/2023  9:09 AM  Dr Radha Olsne MD    S: 34 y o  Kosta Sizer 23w6d here for PN visit  Chief Complaint   Patient presents with   • Routine Prenatal Visit     Patient with c/o nipple discharge  No pain or redness in breasts  She reports nipple discharge, clear  She reports swelling on right breast has decreased      OB complaints:  Contractions: no  Leakage: no  Bleeding: no  Fetal movement: yes      O:  /64 (BP Location: Right arm, Cuff Size: Standard)   Pulse 85   Temp (!) 97 °F (36 1 °C) (Tympanic)   Wt 66 7 kg (147 lb)   LMP 09/22/2022 (Approximate)   BMI 24 46 kg/m²       Review of Systems   Constitutional: Negative  HENT: Negative  Eyes: Negative  Respiratory: Negative  Cardiovascular: Negative  Gastrointestinal: Negative  Endocrine: Negative  Genitourinary:        As noted in HPI   Musculoskeletal: Negative  Skin: Negative  Allergic/Immunologic: Negative  Neurological: Negative  Hematological: Negative  Psychiatric/Behavioral: Negative  Physical Exam  Constitutional:       General: She is not in acute distress  Appearance: Normal appearance  She is well-developed  Genitourinary:   Breasts:     Right: Normal  No swelling, bleeding, inverted nipple, mass, nipple discharge, skin change or tenderness  Left: Normal  No bleeding, inverted nipple, mass, nipple discharge, skin change or tenderness  HENT:      Head: Normocephalic  Nose: Nose normal    Pulmonary:      Effort: Pulmonary effort is normal    Chest:      Chest wall: No mass, lacerations, deformity, swelling, tenderness, crepitus or edema  Abdominal:      General: There is no distension  Palpations: Abdomen is soft  Tenderness: There is no abdominal tenderness  There is no guarding  Lymphadenopathy:      Upper Body:      Right upper body: No supraclavicular or axillary adenopathy        Left upper body: No supraclavicular or axillary adenopathy  Neurological:      General: No focal deficit present  Mental Status: She is alert and oriented to person, place, and time  Skin:     General: Skin is warm and dry  Psychiatric:         Mood and Affect: Mood normal          Behavior: Behavior normal          Thought Content: Thought content normal    Vitals reviewed  Pregravid Weight/BMI: 50 8 kg (112 lb) (BMI 18 64)  Current Weight: 66 7 kg (147 lb)   Total Weight Gain: 15 9 kg (35 lb)   Pre- Vitals    Flowsheet Row Most Recent Value   Prenatal Assessment    Fetal Heart Rate 145   Fundal Height (cm) 23 cm   Movement Present   Prenatal Vitals    Blood Pressure 100/64   Weight - Scale 66 7 kg (147 lb)   Urine Albumin/Glucose    Dilation/Effacement/Station    Vaginal Drainage    Edema            Problem List        Other    23 weeks gestation of pregnancy    History of recreational drug use    Overview     Hx of cocaine use  No use since 2022    Pt attends weekly group therapy    Urine drug screen ordered - not done with NOB labs          Supervision of other normal pregnancy, antepartum    Overview     Declines flu vaccine  covid vaccine completed x 3  NIPT, msAFP low risk          Family history of cleft palate    Overview     FOB's sister         Pap smear of cervix shows high risk HPV present    Overview     2022 pap: negative, HPV HR +, HPV 16/18 negative  Per ASCCP guidelines pap due 2023__         Abscess of right breast    Overview     S/p I&D and biopsy at 19 weeks          Pyelectasis of fetus on prenatal ultrasound    Overview     Bilateral minimal pyelectasis of 4 mm is noted  - f/u growth US         Other Visit Diagnoses     Second trimester pregnancy        Nipple discharge             Growth US at 32 weeks  24 week labs ordered, including prolactin - no nipple discharge on exam  Advised avoidance of breast/nipple stimulation  Declines further up with surgical oncology for now    Follow-up in 4 weeks      Future Appointments   Date Time Provider Gonzalo Santos   2023  6:30 PM  US Americo Hunt MD  3/3/2023  9:09 AM

## 2023-03-02 NOTE — PATIENT INSTRUCTIONS
Pregnancy at 23 to 26 [address]:   You are now close to or at the beginning of the third trimester  The third trimester starts at 24 weeks and ends with delivery  As your baby gets larger, you may develop certain symptoms  These may include pain in your back or down the sides of your abdomen  You may also have stretch marks on your abdomen, breasts, thighs, or buttocks  You may also have constipation  DISCHARGE INSTRUCTIONS:   Return to the emergency department if:   You develop a severe headache that does not go away  You have new or increased vision changes, such as blurred or spotted vision  You have new or increased swelling in your face or hands  You have vaginal spotting or bleeding  Your water broke or you feel warm water gushing or trickling from your vagina  Call your doctor or obstetrician if:   You have abdominal cramps, pressure, or tightening  You have a change in vaginal discharge  You have light bleeding  You have chills or a fever  You have vaginal itching, burning, or pain  You have yellow, green, white, or foul-smelling vaginal discharge  You have pain or burning when you urinate, less urine than usual, or pink or bloody urine  You have questions or concerns about your condition or care  How to care for yourself at this stage of your pregnancy:       Eat a variety of healthy foods  Healthy foods include fruits, vegetables, whole-grain breads, low-fat dairy foods, beans, lean meats, and fish  Drink liquids as directed  Ask how much liquid to drink each day and which liquids are best for you  Limit caffeine to less than 200 milligrams each day  Limit your intake of fish to 2 servings each week  Choose fish low in mercury such as canned light tuna, shrimp, salmon, cod, or tilapia  Do not  eat fish high in mercury such as swordfish, tilefish, swati mackerel, and shark  Manage back pain    Do not stand for long periods of time or lift heavy items  Use good posture while you stand, squat, or bend  Wear low-heeled shoes with good support  Rest may also help to relieve back pain  Ask your healthcare provider about exercises you can do to strengthen your back muscles  Take prenatal vitamins as directed  Your need for certain vitamins and minerals, such as folic acid, increases during pregnancy  Prenatal vitamins provide some of the extra vitamins and minerals you need  Prenatal vitamins may also help to decrease the risk of certain birth defects  Talk to your healthcare provider about exercise  Moderate exercise can help you stay fit  Your healthcare provider will help you plan an exercise program that is safe for you during pregnancy  Do not smoke  Smoking increases your risk of a miscarriage and other health problems during your pregnancy  Smoking can cause your baby to be born too early or weigh less at birth  Ask your healthcare provider for information if you need help quitting  Do not drink alcohol  Alcohol passes from your body to your baby through the placenta  It can affect your baby's brain development and cause fetal alcohol syndrome (FAS)  FAS is a group of conditions that causes mental, behavior, and growth problems  Talk to your healthcare provider before you take any medicines  Many medicines may harm your baby if you take them when you are pregnant  Do not take any medicines, vitamins, herbs, or supplements without first talking to your healthcare provider  Never use illegal or street drugs (such as marijuana or cocaine) while you are pregnant  Safety tips:   Avoid hot tubs and saunas  Do not use a hot tub or sauna while you are pregnant, especially during your first trimester  Hot tubs and saunas may raise your baby's temperature and increase the risk of birth defects  Avoid toxoplasmosis  This is an infection caused by eating raw meat or being around infected cat feces   It can cause birth defects, miscarriages, and other problems  Wash your hands after you touch raw meat  Make sure any meat is well-cooked before you eat it  Avoid raw eggs and unpasteurized milk  Use gloves or ask someone else to clean your cat's litter box while you are pregnant  Changes that are happening with your baby:  By 26 weeks, your baby will weigh about 2 pounds  Your baby will be about 10 inches long from the top of the head to the rump (baby's bottom)  Your baby's movements are much stronger now  Your baby's eyes are almost completely formed and can partially open  Your baby also sleeps and wakes up  What you need to know about prenatal care: Your healthcare provider will check your blood pressure and weight  You may also need the following:  A urine test  may also be done to check for sugar and protein  These can be signs of gestational diabetes or infection  Protein in your urine may also be a sign of preeclampsia  Preeclampsia is a condition that can develop during week 20 or later of your pregnancy  It causes high blood pressure, and it can cause problems with your kidneys and other organs  A gestational diabetes screen  may be done  Your healthcare provider may order either a 1-step or 2-step oral glucose tolerance test (OGTT)  1-step OGTT:  Your blood sugar level will be tested after you have not eaten for 8 hours (fasting)  You will then be given a glucose drink  Your level will be tested again 1 hour and 2 hours after you finish the drink  2-step OGTT:  You do not have to fast for the first part of the test  You will have the glucose drink at any time of day  Your blood sugar level will be checked 1 hour later  If your blood sugar is higher than a certain level, another test will be ordered  You will fast and your blood sugar level will be tested  You will have the glucose drink  Your blood will be tested again 1 hour, 2 hours, and 3 hours after you finish the glucose drink      Fundal height  is a measurement of your uterus to check your baby's growth  This number is usually the same as the number of weeks that you have been pregnant  Your baby's heart rate  will be checked  Follow up with your doctor or obstetrician as directed:  Write down your questions so you remember to ask them during your visits  © Copyright Amb[employer] Ramos 2022 Information is for End User's use only and may not be sold, redistributed or otherwise used for commercial purposes  The above information is an  only  It is not intended as medical advice for individual conditions or treatments  Talk to your doctor, nurse or pharmacist before following any medical regimen to see if it is safe and effective for you

## 2023-03-03 ENCOUNTER — ROUTINE PRENATAL (OUTPATIENT)
Dept: OBGYN CLINIC | Facility: CLINIC | Age: 33
End: 2023-03-03

## 2023-03-03 VITALS
WEIGHT: 147 LBS | HEART RATE: 85 BPM | TEMPERATURE: 97 F | SYSTOLIC BLOOD PRESSURE: 100 MMHG | DIASTOLIC BLOOD PRESSURE: 64 MMHG | BODY MASS INDEX: 24.46 KG/M2

## 2023-03-03 DIAGNOSIS — F19.91 HISTORY OF RECREATIONAL DRUG USE: ICD-10-CM

## 2023-03-03 DIAGNOSIS — Z3A.23 23 WEEKS GESTATION OF PREGNANCY: ICD-10-CM

## 2023-03-03 DIAGNOSIS — R87.810 PAP SMEAR OF CERVIX SHOWS HIGH RISK HPV PRESENT: ICD-10-CM

## 2023-03-03 DIAGNOSIS — N64.52 NIPPLE DISCHARGE: ICD-10-CM

## 2023-03-03 DIAGNOSIS — Z34.92 SECOND TRIMESTER PREGNANCY: ICD-10-CM

## 2023-03-03 DIAGNOSIS — Z34.80 SUPERVISION OF OTHER NORMAL PREGNANCY, ANTEPARTUM: Primary | ICD-10-CM

## 2023-03-03 DIAGNOSIS — Z82.79 FAMILY HISTORY OF CLEFT PALATE: ICD-10-CM

## 2023-03-03 DIAGNOSIS — O35.EXX0 PYELECTASIS OF FETUS ON PRENATAL ULTRASOUND: ICD-10-CM

## 2023-03-03 DIAGNOSIS — N61.1 ABSCESS OF RIGHT BREAST: ICD-10-CM

## 2023-03-03 LAB
SL AMB  POCT GLUCOSE, UA: NEGATIVE
SL AMB POCT URINE PROTEIN: NEGATIVE

## 2023-03-07 ENCOUNTER — TELEPHONE (OUTPATIENT)
Dept: OBGYN CLINIC | Facility: CLINIC | Age: 33
End: 2023-03-07

## 2023-03-07 NOTE — TELEPHONE ENCOUNTER
The patient called and stated if lab work could be put in for DNA testing, patient was advised that we typically do not call in any lab work for paternity testing that it is done when baby is born, patient says the lab told her that it could be done with her OBGYN putting in the lab work script in  Patient states she cannot wait 3 months till her due date and be stressed on the paternity issue  Please advise

## 2023-03-20 ENCOUNTER — TELEPHONE (OUTPATIENT)
Dept: OBGYN CLINIC | Facility: CLINIC | Age: 33
End: 2023-03-20

## 2023-03-20 NOTE — TELEPHONE ENCOUNTER
The patient stated that she got a bill from her insurance since it was originally sent to the [employer] which she no longer has  She asked if the visit from 3/3/23 could be resent with [facility]

## 2023-03-22 ENCOUNTER — APPOINTMENT (OUTPATIENT)
Dept: LAB | Age: 33
End: 2023-03-22

## 2023-03-22 DIAGNOSIS — Z34.92 SECOND TRIMESTER PREGNANCY: ICD-10-CM

## 2023-03-22 DIAGNOSIS — N64.52 NIPPLE DISCHARGE: ICD-10-CM

## 2023-03-22 LAB
BASOPHILS # BLD AUTO: 0.03 THOUSANDS/ÂΜL (ref 0–0.1)
BASOPHILS NFR BLD AUTO: 0 % (ref 0–1)
EOSINOPHIL # BLD AUTO: 0.41 THOUSAND/ÂΜL (ref 0–0.61)
EOSINOPHIL NFR BLD AUTO: 4 % (ref 0–6)
ERYTHROCYTE [DISTWIDTH] IN BLOOD BY AUTOMATED COUNT: 13.2 % (ref 11.6–15.1)
GLUCOSE 1H P 50 G GLC PO SERPL-MCNC: 76 MG/DL (ref 40–134)
HCT VFR BLD AUTO: 33.8 % (ref 34.8–46.1)
HGB BLD-MCNC: 11 G/DL (ref 11.5–15.4)
IMM GRANULOCYTES # BLD AUTO: 0.03 THOUSAND/UL (ref 0–0.2)
IMM GRANULOCYTES NFR BLD AUTO: 0 % (ref 0–2)
LYMPHOCYTES # BLD AUTO: 1.73 THOUSANDS/ÂΜL (ref 0.6–4.47)
LYMPHOCYTES NFR BLD AUTO: 18 % (ref 14–44)
MCH RBC QN AUTO: 30.3 PG (ref 26.8–34.3)
MCHC RBC AUTO-ENTMCNC: 32.5 G/DL (ref 31.4–37.4)
MCV RBC AUTO: 93 FL (ref 82–98)
MONOCYTES # BLD AUTO: 0.37 THOUSAND/ÂΜL (ref 0.17–1.22)
MONOCYTES NFR BLD AUTO: 4 % (ref 4–12)
NEUTROPHILS # BLD AUTO: 6.82 THOUSANDS/ÂΜL (ref 1.85–7.62)
NEUTS SEG NFR BLD AUTO: 74 % (ref 43–75)
NRBC BLD AUTO-RTO: 0 /100 WBCS
PLATELET # BLD AUTO: 380 THOUSANDS/UL (ref 149–390)
PMV BLD AUTO: 10.2 FL (ref 8.9–12.7)
PROLACTIN SERPL-MCNC: 166.4 NG/ML
RBC # BLD AUTO: 3.63 MILLION/UL (ref 3.81–5.12)
TREPONEMA PALLIDUM IGG+IGM AB [PRESENCE] IN SERUM OR PLASMA BY IMMUNOASSAY: NORMAL
WBC # BLD AUTO: 9.39 THOUSAND/UL (ref 4.31–10.16)

## 2023-03-30 NOTE — TELEPHONE ENCOUNTER
From what I see, this visit was billed to [employer] and the patient has no outstanding balance from us

## 2023-04-03 ENCOUNTER — ROUTINE PRENATAL (OUTPATIENT)
Dept: OBGYN CLINIC | Facility: CLINIC | Age: 33
End: 2023-04-03

## 2023-04-03 VITALS
HEIGHT: 65 IN | HEART RATE: 100 BPM | SYSTOLIC BLOOD PRESSURE: 108 MMHG | DIASTOLIC BLOOD PRESSURE: 72 MMHG | WEIGHT: 155 LBS | BODY MASS INDEX: 25.83 KG/M2

## 2023-04-03 DIAGNOSIS — Z34.80 SUPERVISION OF OTHER NORMAL PREGNANCY, ANTEPARTUM: ICD-10-CM

## 2023-04-03 DIAGNOSIS — F19.91 HISTORY OF RECREATIONAL DRUG USE: ICD-10-CM

## 2023-04-03 DIAGNOSIS — Z3A.28 28 WEEKS GESTATION OF PREGNANCY: ICD-10-CM

## 2023-04-03 DIAGNOSIS — Z82.79 FAMILY HISTORY OF CLEFT PALATE: ICD-10-CM

## 2023-04-03 DIAGNOSIS — O35.EXX0 PYELECTASIS OF FETUS ON PRENATAL ULTRASOUND: Primary | ICD-10-CM

## 2023-04-03 DIAGNOSIS — N61.1 ABSCESS OF RIGHT BREAST: ICD-10-CM

## 2023-04-03 LAB
DME PARACHUTE DELIVERY DATE REQUESTED: NORMAL
DME PARACHUTE DELIVERY NOTE: NORMAL
DME PARACHUTE ITEM DESCRIPTION: NORMAL
DME PARACHUTE ORDER STATUS: NORMAL
DME PARACHUTE SUPPLIER NAME: NORMAL
DME PARACHUTE SUPPLIER PHONE: NORMAL
SL AMB  POCT GLUCOSE, UA: NEGATIVE
SL AMB POCT URINE PROTEIN: NEGATIVE

## 2023-04-03 NOTE — PATIENT INSTRUCTIONS
[email or web address]    [address]   [facility] [place], [address] ([id number])    Breastfeeding resources/websites:   [facility]

## 2023-04-03 NOTE — PROGRESS NOTES
"    S: 34 y o  Dandre Nichols 28w2d here for routine prenatal visit  Chief Complaint   Patient presents with   • Routine Prenatal Visit         OB complaints:  Contractions: no  Leakage: no  Bleeding: no  Fetal movement: yes    She plans to transition to fully remote work  Wants to breastfeed       O:  /72 (BP Location: Right arm, Patient Position: Sitting, Cuff Size: Adult)   Pulse 100   Ht 5' 5\" (1 651 m)   Wt 70 3 kg (155 lb)   LMP 2022 (Approximate)   BMI 25 79 kg/m²       Review of Systems   Constitutional: Negative for chills and fever  Eyes: Negative for visual disturbance  Respiratory: Negative for chest tightness and shortness of breath  Cardiovascular: Negative for chest pain  Gastrointestinal: Negative for abdominal pain, diarrhea, nausea and vomiting  Genitourinary: Negative for pelvic pain and vaginal bleeding  As noted in HPI   Skin: Negative for rash  Neurological: Negative for headaches  All other systems reviewed and are negative  Physical Exam  Constitutional:       General: She is not in acute distress  Appearance: Normal appearance  HENT:      Head: Normocephalic and atraumatic  Cardiovascular:      Rate and Rhythm: Normal rate  Pulmonary:      Effort: Pulmonary effort is normal  No respiratory distress  Abdominal:      General: There is no distension  Palpations: Abdomen is soft  Tenderness: There is no abdominal tenderness  There is no guarding or rebound  Comments: gravid   Neurological:      General: No focal deficit present  Mental Status: She is alert  Psychiatric:         Mood and Affect: Mood normal          Behavior: Behavior normal    Vitals and nursing note reviewed             Fundal Height (cm): 27 cm  Fetal Heart Rate: 145    Pregravid Weight/BMI: 50 8 kg (112 lb) (BMI 18 64)  Current Weight: 70 3 kg (155 lb)   Total Weight Gain: 19 5 kg (43 lb)     Pre-Eduardo Vitals    Flowsheet Row Most Recent Value " Prenatal Assessment    Fetal Heart Rate 145   Fundal Height (cm) 27 cm   Movement Present   Prenatal Vitals    Blood Pressure 108/72   Weight - Scale 70 3 kg (155 lb)   Urine Albumin/Glucose    Dilation/Effacement/Station    Vaginal Drainage    Edema             Results for orders placed or performed in visit on 04/03/23   Home Grade Breast Pump   Result Value Ref Range    Supplier Name [employer] [address]     Supplier Phone Number [phone]     Order Status Supplier Submitted     Delivery Note pt undecided on pump type     Delivery Request Date 04/03/2023     Item Description Home grade breast pump, Spectra S2    POCT urine dip   Result Value Ref Range    POCT URINE PROTEIN negative     GLUCOSE, UA negative          Problem List        Other    28 weeks gestation of pregnancy    History of recreational drug use    Overview     Hx of cocaine use  No use since 6/2022    Pt attends weekly group therapy    Urine drug screen ordered - not done with NOB labs          Supervision of other normal pregnancy, antepartum    Overview     Declines flu vaccine  covid vaccine completed x 3  NIPT, msAFP low risk          Current Assessment & Plan     Accepts tdap  Given consents to review  Call coverage reviewed  Breast pump order sent through [facility] D[facility]  OB precautions reviewed         Family history of cleft palate    Overview     FOB's sister         Pap smear of cervix shows high risk HPV present    Overview     12/2022 pap: negative, HPV HR +, HPV 16/18 negative  Per ASCCP guidelines pap due 12/2023__         Abscess of right breast    Overview     S/p I&D and biopsy at 19 weeks          Current Assessment & Plan     Sx resolved          Pyelectasis of fetus on prenatal ultrasound    Overview     Bilateral minimal pyelectasis of 4 mm is noted  - f/u growth [address]  scheduled next month                               Shu Steinberg MD  4/3/2023  9:05 AM

## 2023-04-03 NOTE — ASSESSMENT & PLAN NOTE
Accepts tdap  Given consents to review  Call coverage reviewed  Breast pump order sent through [address] [place] [address] DME  OB precautions reviewed (2) probably inadequate

## 2023-04-06 ENCOUNTER — TELEPHONE (OUTPATIENT)
Dept: OBGYN CLINIC | Facility: CLINIC | Age: 33
End: 2023-04-06

## 2023-04-06 DIAGNOSIS — R30.0 DYSURIA: Primary | ICD-10-CM

## 2023-04-06 NOTE — TELEPHONE ENCOUNTER
The patient called and stated that she thinks she may have a UTI as she was having constant urge to use the restroom and when wiping she did see small pink tint as well as some pink in the toilet as well  States it does burn a little when using the restroom  Please advise

## 2023-04-07 ENCOUNTER — APPOINTMENT (OUTPATIENT)
Dept: LAB | Age: 33
End: 2023-04-07

## 2023-04-07 DIAGNOSIS — R30.0 DYSURIA: ICD-10-CM

## 2023-04-07 LAB
BACTERIA UR QL AUTO: NORMAL /HPF
BILIRUB UR QL STRIP: NEGATIVE
CLARITY UR: CLEAR
COLOR UR: NORMAL
GLUCOSE UR STRIP-MCNC: NEGATIVE MG/DL
HGB UR QL STRIP.AUTO: NEGATIVE
KETONES UR STRIP-MCNC: NEGATIVE MG/DL
LEUKOCYTE ESTERASE UR QL STRIP: NEGATIVE
NITRITE UR QL STRIP: NEGATIVE
NON-SQ EPI CELLS URNS QL MICRO: NORMAL /HPF
PH UR STRIP.AUTO: 7 [PH]
PROT UR STRIP-MCNC: NEGATIVE MG/DL
RBC #/AREA URNS AUTO: NORMAL /HPF
SP GR UR STRIP.AUTO: 1.01 (ref 1–1.03)
UROBILINOGEN UR STRIP-ACNC: <2 MG/DL
WBC #/AREA URNS AUTO: NORMAL /HPF

## 2023-04-09 LAB — BACTERIA UR CULT: ABNORMAL

## 2023-04-10 PROBLEM — O23.43 URINARY TRACT INFECTION IN MOTHER DURING THIRD TRIMESTER OF PREGNANCY: Status: ACTIVE | Noted: 2023-04-10

## 2023-04-16 PROBLEM — O23.43 URINARY TRACT INFECTION IN MOTHER DURING THIRD TRIMESTER OF PREGNANCY: Status: RESOLVED | Noted: 2023-04-10 | Resolved: 2023-04-16

## 2023-04-16 PROBLEM — Z3A.30 30 WEEKS GESTATION OF PREGNANCY: Status: ACTIVE | Noted: 2022-12-08

## 2023-05-01 ENCOUNTER — ULTRASOUND (OUTPATIENT)
Dept: PERINATAL CARE | Facility: OTHER | Age: 33
End: 2023-05-01

## 2023-05-01 VITALS
WEIGHT: 164.4 LBS | BODY MASS INDEX: 27.39 KG/M2 | DIASTOLIC BLOOD PRESSURE: 68 MMHG | HEIGHT: 65 IN | HEART RATE: 78 BPM | SYSTOLIC BLOOD PRESSURE: 122 MMHG

## 2023-05-01 DIAGNOSIS — O35.EXX0 FETAL RENAL ANOMALY, SINGLE GESTATION: Primary | ICD-10-CM

## 2023-05-01 DIAGNOSIS — Z3A.32 32 WEEKS GESTATION OF PREGNANCY: ICD-10-CM

## 2023-05-01 DIAGNOSIS — Z36.4 ULTRASOUND FOR ANTENATAL SCREENING FOR FETAL GROWTH RESTRICTION: ICD-10-CM

## 2023-05-01 NOTE — LETTER
May 2, 2023     MD Bigg Kapoor [address] [facility]    Patient: Maury Rm   YOB: 1990   Date of Visit: 5/1/2023       Dear Dr Deondre Hackett: Thank you for referring Maury Rm to me for evaluation  Below are my notes for this consultation  If you have questions, please do not hesitate to call me  I look forward to following your patient along with you           Sincerely,        Flor Walters MD        CC: No Recipients  Flor Walters MD  4/30/2023 10:57 AM  Sign when Signing Visit  Please refer to the [facility] ultrasound report in Ob Procedures for additional information regarding today's visit

## 2023-05-01 NOTE — PATIENT INSTRUCTIONS
Kick Counts in Pregnancy   WHAT YOU NEED TO KNOW:   Kick counts measure how much your baby is moving in your womb  A kick from your baby can be felt as a twist, turn, swish, roll, or jab  It is common to feel your baby kicking at 26 to 28 weeks of pregnancy  You may feel your baby kick as early as 20 weeks of pregnancy  You may want to start counting at 28 weeks  DISCHARGE INSTRUCTIONS:   Contact your doctor immediately if:   You feel a change in the number of kicks or movements of your baby  You feel fewer than 10 kicks within 2 hours  You have questions or concerns about your baby's movements  Why measure kick counts:  Your baby's movement may provide information about your baby's health  He or she may move less, or not at all, if there are problems  Your baby may move less if he or she is not getting enough oxygen or nutrition from the placenta  Do not smoke while you are pregnant  Smoking decreases the amount of oxygen that gets to your baby  Talk to your healthcare provider if you need help to quit smoking  Tell your healthcare provider as soon as you feel a change in your baby's movements  When to measure kick counts:   Measure kick counts at the same time every day  Measure kick counts when your baby is awake and most active  Your baby may be most active in the evening  How to measure kick counts:  Check that your baby is awake before you measure kick counts  You can wake up your baby by lightly pushing on your belly, walking, or drinking something cold  Your healthcare provider may tell you different ways to measure kick counts  You may be told to do the following:  Use a chart or clock to keep track of the time you start and finish counting  Sit in a chair or lie on your left side  Place your hands on the largest part of your belly  Count until you reach 10 kicks  Write down how much time it takes to count 10 kicks  It may take 30 minutes to 2 hours to count 10 kicks  It should not take more than 2 hours to count 10 kicks  Follow up with your doctor as directed:  Write down your questions so you remember to ask them during your visits  © Copyright Nick Mckinnon 2022 Information is for End User's use only and may not be sold, redistributed or otherwise used for commercial purposes  The above information is an  only  It is not intended as medical advice for individual conditions or treatments  Talk to your doctor, nurse or pharmacist before following any medical regimen to see if it is safe and effective for you

## 2023-05-02 ENCOUNTER — ROUTINE PRENATAL (OUTPATIENT)
Dept: OBGYN CLINIC | Facility: CLINIC | Age: 33
End: 2023-05-02

## 2023-05-02 VITALS
BODY MASS INDEX: 27.69 KG/M2 | HEART RATE: 86 BPM | WEIGHT: 166.2 LBS | SYSTOLIC BLOOD PRESSURE: 112 MMHG | DIASTOLIC BLOOD PRESSURE: 68 MMHG | HEIGHT: 65 IN

## 2023-05-02 DIAGNOSIS — Z82.79 FAMILY HISTORY OF CLEFT PALATE: Primary | ICD-10-CM

## 2023-05-02 DIAGNOSIS — F19.91 HISTORY OF RECREATIONAL DRUG USE: ICD-10-CM

## 2023-05-02 DIAGNOSIS — Z34.80 SUPERVISION OF OTHER NORMAL PREGNANCY, ANTEPARTUM: ICD-10-CM

## 2023-05-02 DIAGNOSIS — Z3A.32 32 WEEKS GESTATION OF PREGNANCY: ICD-10-CM

## 2023-05-02 PROBLEM — O23.40 UTI (URINARY TRACT INFECTION) DURING PREGNANCY: Status: ACTIVE | Noted: 2023-05-02

## 2023-05-02 PROBLEM — O26.00 EXCESSIVE WEIGHT GAIN IN PREGNANCY: Status: ACTIVE | Noted: 2023-05-02

## 2023-05-02 NOTE — PROGRESS NOTES
"    S: 34 y o  Fabiola [employer] here for routine prenatal visit  Chief Complaint   Patient presents with    Routine Prenatal Visit         OB complaints:  Contractions: no  Leakage: no  Bleeding: no  Fetal movement: yes      O:  /68   Pulse 86   Ht 5' 5\" (1 651 m)   Wt 75 4 kg (166 lb 3 2 oz)   LMP 2022 (Approximate)   BMI 27 66 kg/m²       Review of Systems   Constitutional: Negative for chills and fever  Eyes: Negative for visual disturbance  Respiratory: Negative for chest tightness and shortness of breath  Cardiovascular: Negative for chest pain  Gastrointestinal: Negative for abdominal pain, diarrhea, nausea and vomiting  Genitourinary: Negative for pelvic pain and vaginal bleeding  As noted in HPI   Skin: Negative for rash  Neurological: Negative for headaches  All other systems reviewed and are negative  Physical Exam  Constitutional:       General: She is not in acute distress  Appearance: Normal appearance  HENT:      Head: Normocephalic and atraumatic  Cardiovascular:      Rate and Rhythm: Normal rate  Pulmonary:      Effort: Pulmonary effort is normal  No respiratory distress  Abdominal:      General: There is no distension  Palpations: Abdomen is soft  Tenderness: There is no abdominal tenderness  There is no guarding or rebound  Comments: gravid   Neurological:      General: No focal deficit present  Mental Status: She is alert  Psychiatric:         Mood and Affect: Mood normal          Behavior: Behavior normal    Vitals and nursing note reviewed             Fundal Height (cm): 32 cm  Fetal Heart Rate: 140    Pregravid Weight/BMI: 50 8 kg (112 lb) (BMI 18 64)  Current Weight: 75 4 kg (166 lb 3 2 oz)   Total Weight Gain: 24 6 kg (54 lb 3 2 oz)     Pre- Vitals    Flowsheet Row Most Recent Value   Prenatal Assessment    Fetal Heart Rate 140   Fundal Height (cm) 32 cm   Movement Present   Prenatal Vitals    Blood " Pressure 112/68   Weight - Scale 75 4 kg (166 lb 3 2 oz)   Urine Albumin/Glucose    Dilation/Effacement/Station    Vaginal Drainage    Edema                   Problem List        Genitourinary    UTI (urinary tract infection) during pregnancy    Overview     4/7 UCx >100k Strep mitis oralis - tx with macrobid  MAGALI 4/18 <10k mixed contaminants only             Other    32 weeks gestation of pregnancy    History of recreational drug use    Overview     Hx of cocaine use  No use since 6/2022  Pt attends weekly group therapy    Urine drug screen ordered - not done with [facility] labs          Supervision of other normal pregnancy, antepartum    Overview     Declines flu vaccine  covid vaccine completed x 3  NIPT, msAFP low risk   S/p tdap   Consents signed         Current Assessment & Plan     Remaining consents reviewed and signed  OB precautions reviewed         Family history of cleft palate    Overview     FOB's sister         Pap smear of cervix shows high risk HPV present    Overview     12/2022 pap: negative, HPV HR +, HPV 16/18 negative  Per [facility] guidelines pap due 12/2023__         Abscess of right breast    Overview     S/p I&D and biopsy at 19 weeks          Pyelectasis of fetus on prenatal ultrasound    Overview     Bilateral minimal pyelectasis of 4 mm is noted  f/u growth US: none noted   EFW 64th%         Excessive weight gain in pregnancy    Overview     >50 lbs at 32 weeks                               Fabien Torrez MD  5/2/2023  5:16 PM

## 2023-06-01 ENCOUNTER — ROUTINE PRENATAL (OUTPATIENT)
Dept: OBGYN CLINIC | Facility: CLINIC | Age: 33
End: 2023-06-01

## 2023-06-01 VITALS
BODY MASS INDEX: 28.62 KG/M2 | WEIGHT: 171.8 LBS | HEART RATE: 122 BPM | HEIGHT: 65 IN | SYSTOLIC BLOOD PRESSURE: 132 MMHG | DIASTOLIC BLOOD PRESSURE: 84 MMHG

## 2023-06-01 DIAGNOSIS — F19.91 HISTORY OF RECREATIONAL DRUG USE: ICD-10-CM

## 2023-06-01 DIAGNOSIS — Z34.80 SUPERVISION OF OTHER NORMAL PREGNANCY, ANTEPARTUM: ICD-10-CM

## 2023-06-01 DIAGNOSIS — Z3A.36 36 WEEKS GESTATION OF PREGNANCY: ICD-10-CM

## 2023-06-01 DIAGNOSIS — Z3A.32 32 WEEKS GESTATION OF PREGNANCY: ICD-10-CM

## 2023-06-01 DIAGNOSIS — Z82.79 FAMILY HISTORY OF CLEFT PALATE: Primary | ICD-10-CM

## 2023-06-01 LAB
SL AMB  POCT GLUCOSE, UA: NEGATIVE
SL AMB POCT URINE PROTEIN: NEGATIVE

## 2023-06-01 PROCEDURE — 87150 DNA/RNA AMPLIFIED PROBE: CPT | Performed by: OBSTETRICS & GYNECOLOGY

## 2023-06-01 NOTE — ASSESSMENT & PLAN NOTE
GBS collected  SVE as above  Cephalic presentation confirmed on limited US   OB precautions reviewed

## 2023-06-01 NOTE — PROGRESS NOTES
"    S: 34 y o   36w5d here for routine prenatal visit  Chief Complaint   Patient presents with   • Routine Prenatal Visit     Lower back pain has been more frequent at night         OB complaints:  Contractions: few irregular  More back pain after walking    Leakage: no  Bleeding: no  Fetal movement: yes      O:  /84   Pulse (!) 122   Ht 5' 5\" (1 651 m)   Wt 77 9 kg (171 lb 12 8 oz)   LMP 2022 (Approximate)   BMI 28 59 kg/m²       Review of Systems   Constitutional: Negative for chills and fever  Eyes: Negative for visual disturbance  Respiratory: Negative for chest tightness and shortness of breath  Cardiovascular: Negative for chest pain  Gastrointestinal: Negative for abdominal pain, diarrhea, nausea and vomiting  Genitourinary: Negative for pelvic pain and vaginal bleeding  As noted in HPI   Skin: Negative for rash  Neurological: Negative for headaches  All other systems reviewed and are negative  Physical Exam  Constitutional:       General: She is not in acute distress  Appearance: Normal appearance  Genitourinary:      Right Labia: No rash, tenderness, lesions or skin changes  Left Labia: No tenderness, lesions, skin changes or rash  Pelvic exam was performed with patient in the lithotomy position  HENT:      Head: Normocephalic and atraumatic  Cardiovascular:      Rate and Rhythm: Normal rate  Pulmonary:      Effort: Pulmonary effort is normal  No respiratory distress  Abdominal:      General: There is no distension  Palpations: Abdomen is soft  Tenderness: There is no abdominal tenderness  There is no guarding or rebound  Comments: gravid   Neurological:      General: No focal deficit present  Mental Status: She is alert  Psychiatric:         Mood and Affect: Mood normal          Behavior: Behavior normal    Vitals and nursing note reviewed  Exam conducted with a chaperone present             Fundal Height " (cm): 36 cm  Fetal Heart Rate: 130    Pregravid Weight/BMI: 50 8 kg (112 lb) (BMI 18 64)  Current Weight: 77 9 kg (171 lb 12 8 oz)   Total Weight Gain: 27 1 kg (59 lb 12 8 oz)     Pre-Eduardo Vitals    Flowsheet Row Most Recent Value   Prenatal Assessment    Fetal Heart Rate 130   Fundal Height (cm) 36 cm   Movement Present   Presentation Vertex   Prenatal Vitals    Blood Pressure 132/84   Weight - Scale 77 9 kg (171 lb 12 8 oz)   Urine Albumin/Glucose    Dilation/Effacement/Station    Cervical Dilation   5   Cervical Effacement 30   Fetal Station -3   Vaginal Drainage    Edema             Results for orders placed or performed in visit on 23   POCT urine dip   Result Value Ref Range    POCT URINE PROTEIN Negative     GLUCOSE, UA Negative        Limited transabdominal US with SLIUP in cephalic presentation    Problem List        Genitourinary    UTI (urinary tract infection) during pregnancy    Overview      UCx >100k Strep mitis oralis - tx with macrobid  MAGALI  <10k mixed contaminants only             Other    36 weeks gestation of pregnancy    History of recreational drug use    Overview     Hx of cocaine use  No use since 2022    Pt attends weekly group therapy    Urine drug screen ordered - not done with [facility] labs          Supervision of other normal pregnancy, antepartum    Overview     Declines flu vaccine  covid vaccine completed x 3  NIPT, msAFP low risk   S/p tdap   Consents signed         Current Assessment & Plan     GBS collected  SVE as above  Cephalic presentation confirmed on limited US   OB precautions reviewed         Family history of cleft palate    Overview     FOB's sister         Pap smear of cervix shows high risk HPV present    Overview     2022 pap: negative, HPV HR +, HPV 16/18 negative  Per [facility] guidelines pap due 2023__         Abscess of right breast    Overview     S/p I&D and biopsy at 19 weeks          Pyelectasis of fetus on prenatal ultrasound    Overview Bilateral minimal pyelectasis of 4 mm is noted  f/u growth US: none noted   EFW 64th%         Excessive weight gain in pregnancy    Overview     >50 lbs at 32 weeks                               Daria Lanier MD  6/1/2023  3:18 PM

## 2023-06-05 PROBLEM — Z3A.37 37 WEEKS GESTATION OF PREGNANCY: Status: ACTIVE | Noted: 2022-12-08

## 2023-06-05 PROBLEM — O23.40 UTI (URINARY TRACT INFECTION) DURING PREGNANCY: Status: RESOLVED | Noted: 2023-05-02 | Resolved: 2023-06-05

## 2023-06-05 LAB — GP B STREP DNA SPEC QL NAA+PROBE: NEGATIVE

## 2023-06-05 NOTE — PATIENT INSTRUCTIONS
Pregnancy at [address] to [address]:   You are considered full term at the beginning of 37 weeks  Your breathing may be easier if your baby has moved down into a head-down position  You may need to urinate more often because the baby may be pressing on your bladder  You may also feel more discomfort and get tired easily  DISCHARGE INSTRUCTIONS:   Return to the emergency department if:   You develop a severe headache that does not go away  You have new or increased vision changes, such as blurred or spotted vision  You have new or increased swelling in your face or hands  You have vaginal spotting or bleeding  Your water broke or you feel warm water gushing or trickling from your vagina  Call your obstetrician if:   You have more than 5 contractions in 1 hour  You notice any changes in your baby's movements  You have abdominal cramps, pressure, or tightening  You have a change in vaginal discharge  You have chills or a fever  You have vaginal itching, burning, or pain  You have yellow, green, white, or foul-smelling vaginal discharge  You have pain or burning when you urinate, less urine than usual, or pink or bloody urine  You have questions or concerns about your condition or care  How to care for yourself at this stage of your pregnancy:       Eat a variety of healthy foods  Healthy foods include fruits, vegetables, whole-grain breads, low-fat dairy foods, beans, lean meats, and fish  Drink liquids as directed  Ask how much liquid to drink each day and which liquids are best for you  Limit caffeine to less than 200 milligrams each day  Limit your intake of fish to 2 servings each week  Choose fish low in mercury such as canned light tuna, shrimp, salmon, cod, or tilapia  Do not  eat fish high in mercury such as swordfish, tilefish, swati mackerel, and shark  Take prenatal vitamins as directed    Your need for certain vitamins and minerals, such as folic acid, increases during pregnancy  Prenatal vitamins provide some of the extra vitamins and minerals you need  Prenatal vitamins may also help to decrease the risk of certain birth defects  Rest as needed  Put your feet up if you have swelling in your ankles and feet  Talk to your healthcare provider about exercise  Moderate exercise can help you stay fit  Your healthcare provider will help you plan an exercise program that is safe for you during pregnancy  Do not smoke  Smoking increases your risk of a miscarriage and other health problems during your pregnancy  Smoking can cause your baby to be born early or weigh less at birth  Ask your healthcare provider for information if you need help quitting  Do not drink alcohol  Alcohol passes from your body to your baby through the placenta  It can affect your baby's brain development and cause fetal alcohol syndrome (FAS)  FAS is a group of conditions that causes mental, behavior, and growth problems  Talk to your healthcare provider before you take any medicines  Many medicines may harm your baby if you take them when you are pregnant  Do not take any medicines, vitamins, herbs, or supplements without first talking to your healthcare provider  Never use illegal or street drugs (such as marijuana or cocaine) while you are pregnant  Safety tips:   Avoid hot tubs and saunas  Do not use a hot tub or sauna while you are pregnant, especially during your first trimester  Hot tubs and saunas may raise your baby's temperature and increase the risk of birth defects  Avoid toxoplasmosis  This is an infection caused by eating raw meat or being around infected cat feces  It can cause birth defects, miscarriages, and other problems  Wash your hands after you touch raw meat  Make sure any meat is well-cooked before you eat it  Avoid raw eggs and unpasteurized milk   Use gloves or ask someone else to clean your cat's litter box while you are pregnant  Ask your healthcare provider about travel  The most comfortable time to travel is during the second trimester  Ask your provider if you can travel after 36 weeks  You may not be able to travel in an airplane after 36 weeks  He or she may also recommend you avoid long road trips  Changes happening with your baby:  By 38 weeks, your baby may weigh between 6 and 9 pounds  Your baby may be about 14 inches long from the top of the head to the rump (baby's bottom)  Your baby hears well enough to know your voice  As your baby gets larger, you may feel fewer kicks and more stretching and rolling  Your baby may move into a head-down position  Your baby will also rest lower in your abdomen  What you need to know about prenatal care: Your healthcare provider will check your blood pressure and weight  You may also need the following:  A urine test  may also be done to check for sugar and protein  These can be signs of gestational diabetes or infection  Protein in your urine may also be a sign of preeclampsia  Preeclampsia is a condition that can develop during week 20 or later of your pregnancy  It causes high blood pressure, and it can cause problems with your kidneys and other organs  A gestational diabetes screen  may be done  Your healthcare provider may order either a 1-step or 2-step oral glucose tolerance test (OGTT)  1-step OGTT:  Your blood sugar level will be tested after you have not eaten for 8 hours (fasting)  You will then be given a glucose drink  Your level will be tested again 1 hour and 2 hours after you finish the drink  2-step OGTT:  You do not have to fast for the first part of the test  You will have the glucose drink at any time of day  Your blood sugar level will be checked 1 hour later  If your blood sugar is higher than a certain level, another test will be ordered  You will fast and your blood sugar level will be tested  You will have the glucose drink   Your blood will be tested again 1 hour, 2 hours, and 3 hours after you finish the glucose drink  A blood test  may be done to check for anemia (low iron level)  A Tdap vaccine  may be recommended by your healthcare provider  A group B strep test  is a test that is done to check for group B strep infection  Group B strep is a type of bacteria that may be found in the vagina or rectum  It can be passed to your baby during delivery if you have it  Your healthcare provider will take swab your vagina or rectum and send the sample to the lab for tests  Fundal height  is a measurement of your uterus to check your baby's growth  This number is usually the same as the number of weeks that you have been pregnant  Your healthcare provider may also check your baby's position  Your baby's heart rate  will be checked  Follow up with your obstetrician as directed:  Write down your questions so you remember to ask them during your visits  © Copyright [employer] 2022 Information is for End User's use only and may not be sold, redistributed or otherwise used for commercial purposes  The above information is an  only  It is not intended as medical advice for individual conditions or treatments  Talk to your doctor, nurse or pharmacist before following any medical regimen to see if it is safe and effective for you

## 2023-06-05 NOTE — PROGRESS NOTES
OB/GYN  PN Visit  Sumaya Camera  [id number]  2023  2:48 PM  Dr Catie Davis MD    S: 34 y o   37w4d here for PN visit  Chief Complaint   Patient presents with   • Routine Prenatal Visit     Pt with c/o swelling in lower legs and ankles  No redness or swelling  OB complaints:  Contractions: no  Leakage: no   Bleeding: no  Fetal movement: yes    Pt requests cervical exam today    O:  /80   Pulse 100   Temp 97 5 °F (36 4 °C) (Tympanic)   Wt 79 4 kg (175 lb)   LMP 2022 (Approximate)   BMI 29 12 kg/m²       Review of Systems   Constitutional: Negative  HENT: Negative  Eyes: Negative  Respiratory: Negative  Cardiovascular: Negative  Gastrointestinal: Negative  Endocrine: Negative  Genitourinary:        As noted in HPI   Musculoskeletal: Negative  Skin: Negative  Allergic/Immunologic: Negative  Neurological: Negative  Hematological: Negative  Psychiatric/Behavioral: Negative  Physical Exam  Constitutional:       General: She is not in acute distress  Appearance: She is well-developed  Abdominal:      Palpations: Abdomen is soft  Tenderness: There is no abdominal tenderness  There is no guarding  Neurological:      Mental Status: She is alert and oriented to person, place, and time  Skin:     General: Skin is warm and dry     Psychiatric:         Behavior: Behavior normal               Pregravid Weight/BMI: 50 8 kg (112 lb) (BMI 18 64)  Current Weight: 79 4 kg (175 lb)   Total Weight Gain: 28 6 kg (63 lb)   Pre- Vitals    Flowsheet Row Most Recent Value   Prenatal Assessment    Fetal Heart Rate 130   Fundal Height (cm) 36 cm   Movement Present   Presentation Vertex   Prenatal Vitals    Blood Pressure 120/80   Weight - Scale 79 4 kg (175 lb)   Urine Albumin/Glucose    Dilation/Effacement/Station    Cervical Dilation 1   Cervical Effacement 50   Fetal Station -3   Vaginal Drainage    Edema            Problem List Other    37 weeks gestation of pregnancy    History of recreational drug use    Overview     Hx of cocaine use  No use since 6/2022  Pt attends weekly group therapy    Urine drug screen ordered - not done with [facility] labs          Supervision of other normal pregnancy, antepartum    Overview     Declines flu vaccine  covid vaccine completed x 3  NIPT, msAFP low risk   S/p tdap   Consents signed         Family history of cleft palate    Overview     FOB's sister         Pap smear of cervix shows high risk HPV present    Overview     12/2022 pap: negative, HPV HR +, HPV 16/18 negative  Per [facility] guidelines pap due 12/2023__         Abscess of right breast    Overview     S/p I&D and biopsy at 19 weeks          Pyelectasis of fetus on prenatal ultrasound    Overview     Bilateral minimal pyelectasis of 4 mm is noted  f/u growth US: none noted  EFW 64th%         Excessive weight gain in pregnancy    Overview     >50 lbs at 32 weeks         Other Visit Diagnoses     Third trimester pregnancy    -  Primary    Swelling of lower extremity during pregnancy in third trimester             Advised leg elevation, compression stocking  Preeclampsia labs ordered  Discussed birth control options for breastfeeding   Labor precautions    Follow-up in 1 week    No future appointments              Felix Joseph MD  6/7/2023  2:48 PM

## 2023-06-07 ENCOUNTER — ROUTINE PRENATAL (OUTPATIENT)
Dept: OBGYN CLINIC | Facility: CLINIC | Age: 33
End: 2023-06-07
Payer: COMMERCIAL

## 2023-06-07 VITALS
SYSTOLIC BLOOD PRESSURE: 120 MMHG | DIASTOLIC BLOOD PRESSURE: 80 MMHG | WEIGHT: 175 LBS | BODY MASS INDEX: 29.12 KG/M2 | TEMPERATURE: 97.5 F | HEART RATE: 100 BPM

## 2023-06-07 DIAGNOSIS — Z3A.37 37 WEEKS GESTATION OF PREGNANCY: ICD-10-CM

## 2023-06-07 DIAGNOSIS — O12.03 SWELLING OF LOWER EXTREMITY DURING PREGNANCY IN THIRD TRIMESTER: ICD-10-CM

## 2023-06-07 DIAGNOSIS — Z34.93 THIRD TRIMESTER PREGNANCY: Primary | ICD-10-CM

## 2023-06-07 DIAGNOSIS — Z34.80 SUPERVISION OF OTHER NORMAL PREGNANCY, ANTEPARTUM: ICD-10-CM

## 2023-06-07 DIAGNOSIS — R87.810 PAP SMEAR OF CERVIX SHOWS HIGH RISK HPV PRESENT: ICD-10-CM

## 2023-06-07 DIAGNOSIS — O26.00 EXCESSIVE WEIGHT GAIN DURING PREGNANCY, ANTEPARTUM: ICD-10-CM

## 2023-06-07 DIAGNOSIS — O35.EXX0 PYELECTASIS OF FETUS ON PRENATAL ULTRASOUND: ICD-10-CM

## 2023-06-07 DIAGNOSIS — Z82.79 FAMILY HISTORY OF CLEFT PALATE: ICD-10-CM

## 2023-06-07 DIAGNOSIS — F19.91 HISTORY OF RECREATIONAL DRUG USE: ICD-10-CM

## 2023-06-07 LAB
SL AMB  POCT GLUCOSE, UA: NEGATIVE
SL AMB POCT URINE PROTEIN: NEGATIVE

## 2023-06-07 PROCEDURE — PNV: Performed by: OBSTETRICS & GYNECOLOGY

## 2023-06-07 PROCEDURE — 81002 URINALYSIS NONAUTO W/O SCOPE: CPT | Performed by: OBSTETRICS & GYNECOLOGY

## 2023-06-09 ENCOUNTER — APPOINTMENT (OUTPATIENT)
Dept: LAB | Age: 33
End: 2023-06-09
Payer: COMMERCIAL

## 2023-06-09 DIAGNOSIS — Z3A.37 37 WEEKS GESTATION OF PREGNANCY: ICD-10-CM

## 2023-06-09 DIAGNOSIS — O12.03 SWELLING OF LOWER EXTREMITY DURING PREGNANCY IN THIRD TRIMESTER: ICD-10-CM

## 2023-06-09 LAB
ALBUMIN SERPL BCP-MCNC: 2.5 G/DL (ref 3.5–5)
ALP SERPL-CCNC: 125 U/L (ref 46–116)
ALT SERPL W P-5'-P-CCNC: 18 U/L (ref 12–78)
ANION GAP SERPL CALCULATED.3IONS-SCNC: 5 MMOL/L (ref 4–13)
AST SERPL W P-5'-P-CCNC: 22 U/L (ref 5–45)
BILIRUB SERPL-MCNC: 0.31 MG/DL (ref 0.2–1)
BUN SERPL-MCNC: 14 MG/DL (ref 5–25)
CALCIUM ALBUM COR SERPL-MCNC: 10 MG/DL (ref 8.3–10.1)
CALCIUM SERPL-MCNC: 8.8 MG/DL (ref 8.3–10.1)
CHLORIDE SERPL-SCNC: 110 MMOL/L (ref 96–108)
CO2 SERPL-SCNC: 21 MMOL/L (ref 21–32)
CREAT SERPL-MCNC: 0.84 MG/DL (ref 0.6–1.3)
CREAT UR-MCNC: 44.2 MG/DL
ERYTHROCYTE [DISTWIDTH] IN BLOOD BY AUTOMATED COUNT: 12.5 % (ref 11.6–15.1)
GFR SERPL CREATININE-BSD FRML MDRD: 91 ML/MIN/1.73SQ M
GLUCOSE P FAST SERPL-MCNC: 96 MG/DL (ref 65–99)
HCT VFR BLD AUTO: 32.5 % (ref 34.8–46.1)
HGB BLD-MCNC: 10.4 G/DL (ref 11.5–15.4)
MCH RBC QN AUTO: 29.3 PG (ref 26.8–34.3)
MCHC RBC AUTO-ENTMCNC: 32 G/DL (ref 31.4–37.4)
MCV RBC AUTO: 92 FL (ref 82–98)
PLATELET # BLD AUTO: 264 THOUSANDS/UL (ref 149–390)
PMV BLD AUTO: 11.7 FL (ref 8.9–12.7)
POTASSIUM SERPL-SCNC: 4 MMOL/L (ref 3.5–5.3)
PROT SERPL-MCNC: 7 G/DL (ref 6.4–8.4)
PROT UR-MCNC: <6 MG/DL
RBC # BLD AUTO: 3.55 MILLION/UL (ref 3.81–5.12)
SODIUM SERPL-SCNC: 136 MMOL/L (ref 135–147)
WBC # BLD AUTO: 9.6 THOUSAND/UL (ref 4.31–10.16)

## 2023-06-09 PROCEDURE — 80053 COMPREHEN METABOLIC PANEL: CPT

## 2023-06-09 PROCEDURE — 84156 ASSAY OF PROTEIN URINE: CPT | Performed by: OBSTETRICS & GYNECOLOGY

## 2023-06-09 PROCEDURE — 85027 COMPLETE CBC AUTOMATED: CPT

## 2023-06-09 PROCEDURE — 82570 ASSAY OF URINE CREATININE: CPT | Performed by: OBSTETRICS & GYNECOLOGY

## 2023-06-09 PROCEDURE — 36415 COLL VENOUS BLD VENIPUNCTURE: CPT

## 2023-06-12 ENCOUNTER — TELEPHONE (OUTPATIENT)
Dept: OBGYN CLINIC | Facility: CLINIC | Age: 33
End: 2023-06-12

## 2023-06-15 NOTE — PROGRESS NOTES
OB/GYN  PN Visit  Radha Walter  [id number]  6/16/2023  1:39 PM  Dr Maxim Hernández MD    S: 34 y o  Clabe Inch 38w6d here for PN visit  Chief Complaint   Patient presents with   • Routine Prenatal Visit     Pt with no concerns, wants to discuss IOL          OB complaints:  Contractions: yes, intermittent with pressure  Leakage: no  Bleeding: no  Fetal movement: yes      O:  /70 (BP Location: Right arm, Cuff Size: Standard)   Pulse 104   Temp 97 7 °F (36 5 °C) (Tympanic)   Wt 81 kg (178 lb 8 oz)   LMP 09/22/2022 (Approximate)   BMI 29 70 kg/m²       Review of Systems   Constitutional: Negative  HENT: Negative  Eyes: Negative  Respiratory: Negative  Cardiovascular: Negative  Gastrointestinal: Negative  Endocrine: Negative  Genitourinary:        As noted in HPI   Musculoskeletal: Negative  Skin: Negative  Allergic/Immunologic: Negative  Neurological: Negative  Hematological: Negative  Psychiatric/Behavioral: Negative  Physical Exam  Constitutional:       General: She is not in acute distress  Appearance: Normal appearance  She is well-developed  Genitourinary:      Right Labia: No rash, tenderness, lesions, skin changes or Bartholin's cyst      Left Labia: No tenderness, lesions, skin changes, Bartholin's cyst or rash  No labial fusion noted  No inguinal adenopathy present in the right or left side  Pelvic exam was performed with patient in the lithotomy position  HENT:      Head: Normocephalic  Cardiovascular:      Rate and Rhythm: Normal rate  Pulmonary:      Effort: Pulmonary effort is normal    Abdominal:      General: There is no distension  Palpations: Abdomen is soft  Tenderness: There is no abdominal tenderness  There is no guarding  Hernia: There is no hernia in the left inguinal area or right inguinal area  Musculoskeletal:         General: No swelling or deformity     Lymphadenopathy:      Lower Body: No right inguinal adenopathy  No left inguinal adenopathy  Neurological:      General: No focal deficit present  Mental Status: She is alert and oriented to person, place, and time  Skin:     General: Skin is warm and dry  Psychiatric:         Mood and Affect: Mood normal          Behavior: Behavior normal    Vitals reviewed  Pregravid Weight/BMI: 50 8 kg (112 lb) (BMI 18 64)  Current Weight: 81 kg (178 lb 8 oz)   Total Weight Gain: 30 2 kg (66 lb 8 oz)   Pre- Vitals    Flowsheet Row Most Recent Value   Prenatal Assessment    Fetal Heart Rate 126   Fundal Height (cm) 37 cm   Movement Present   Presentation Vertex   Prenatal Vitals    Blood Pressure 124/70   Weight - Scale 81 kg (178 lb 8 oz)   Urine Albumin/Glucose    Dilation/Effacement/Station    Vaginal Drainage    Edema            Problem List        Other    37 weeks gestation of pregnancy    History of recreational drug use    Overview     Hx of cocaine use  No use since 2022  Pt attends weekly group therapy    Urine drug screen ordered - not done with [facility] labs          Supervision of other normal pregnancy, antepartum    Overview     Declines flu vaccine  covid vaccine completed x 3  NIPT, msAFP low risk   S/p tdap   Consents signed         Family history of cleft palate    Overview     FOB's sister         Pap smear of cervix shows high risk HPV present    Overview     2022 pap: negative, HPV HR +, HPV 16/18 negative  Per [facility] guidelines pap due 2023__         Abscess of right breast    Overview     S/p I&D and biopsy at 19 weeks          Pyelectasis of fetus on prenatal ultrasound    Overview     Bilateral minimal pyelectasis of 4 mm is noted  f/u growth US: none noted  EFW 64th%         Excessive weight gain in pregnancy    Overview     >50 lbs at 32 weeks              GBS negative  Labor precautions    Induction of labor process discussed      Discussed indication for induction such as elective (> or equal to 39 weeks), medical/obstetric indications and alternatives which is to await spontaneous labor  Discussed cervical ripening if cervix is unfavorable with prostaglandin (vaginal misoprostol) or mechanical dilation with insertion of balloon catheter  Discussed that when electively induced nulliparous or multiparous women are compared with expectantly managed women, there is no evidence that elective induction is associated with increased risk of   Inductions consent signed    No future appointments              Catie Davis MD  2023  1:39 PM

## 2023-06-16 ENCOUNTER — ROUTINE PRENATAL (OUTPATIENT)
Dept: OBGYN CLINIC | Facility: CLINIC | Age: 33
End: 2023-06-16

## 2023-06-16 VITALS
SYSTOLIC BLOOD PRESSURE: 124 MMHG | WEIGHT: 178.5 LBS | BODY MASS INDEX: 29.7 KG/M2 | TEMPERATURE: 97.7 F | DIASTOLIC BLOOD PRESSURE: 70 MMHG | HEART RATE: 104 BPM

## 2023-06-16 DIAGNOSIS — F19.91 HISTORY OF RECREATIONAL DRUG USE: ICD-10-CM

## 2023-06-16 DIAGNOSIS — Z34.80 SUPERVISION OF OTHER NORMAL PREGNANCY, ANTEPARTUM: ICD-10-CM

## 2023-06-16 DIAGNOSIS — O26.00 EXCESSIVE WEIGHT GAIN DURING PREGNANCY, ANTEPARTUM: ICD-10-CM

## 2023-06-16 DIAGNOSIS — Z82.79 FAMILY HISTORY OF CLEFT PALATE: Primary | ICD-10-CM

## 2023-06-16 DIAGNOSIS — Z3A.37 37 WEEKS GESTATION OF PREGNANCY: ICD-10-CM

## 2023-06-16 PROCEDURE — PNV: Performed by: OBSTETRICS & GYNECOLOGY

## 2023-06-16 NOTE — PATIENT INSTRUCTIONS
Pregnancy at [address] to [address]:   You are considered full term at the beginning of 37 weeks  Your breathing may be easier if your baby has moved down into a head-down position  You may need to urinate more often because the baby may be pressing on your bladder  You may also feel more discomfort and get tired easily  DISCHARGE INSTRUCTIONS:   Return to the emergency department if:   You develop a severe headache that does not go away  You have new or increased vision changes, such as blurred or spotted vision  You have new or increased swelling in your face or hands  You have vaginal spotting or bleeding  Your water broke or you feel warm water gushing or trickling from your vagina  Call your obstetrician if:   You have more than 5 contractions in 1 hour  You notice any changes in your baby's movements  You have abdominal cramps, pressure, or tightening  You have a change in vaginal discharge  You have chills or a fever  You have vaginal itching, burning, or pain  You have yellow, green, white, or foul-smelling vaginal discharge  You have pain or burning when you urinate, less urine than usual, or pink or bloody urine  You have questions or concerns about your condition or care  How to care for yourself at this stage of your pregnancy:       Eat a variety of healthy foods  Healthy foods include fruits, vegetables, whole-grain breads, low-fat dairy foods, beans, lean meats, and fish  Drink liquids as directed  Ask how much liquid to drink each day and which liquids are best for you  Limit caffeine to less than 200 milligrams each day  Limit your intake of fish to 2 servings each week  Choose fish low in mercury such as canned light tuna, shrimp, salmon, cod, or tilapia  Do not  eat fish high in mercury such as swordfish, tilefish, swati mackerel, and shark  Take prenatal vitamins as directed    Your need for certain vitamins and minerals, such as folic acid, increases during pregnancy  Prenatal vitamins provide some of the extra vitamins and minerals you need  Prenatal vitamins may also help to decrease the risk of certain birth defects  Rest as needed  Put your feet up if you have swelling in your ankles and feet  Talk to your healthcare provider about exercise  Moderate exercise can help you stay fit  Your healthcare provider will help you plan an exercise program that is safe for you during pregnancy  Do not smoke  Smoking increases your risk of a miscarriage and other health problems during your pregnancy  Smoking can cause your baby to be born early or weigh less at birth  Ask your healthcare provider for information if you need help quitting  Do not drink alcohol  Alcohol passes from your body to your baby through the placenta  It can affect your baby's brain development and cause fetal alcohol syndrome (FAS)  FAS is a group of conditions that causes mental, behavior, and growth problems  Talk to your healthcare provider before you take any medicines  Many medicines may harm your baby if you take them when you are pregnant  Do not take any medicines, vitamins, herbs, or supplements without first talking to your healthcare provider  Never use illegal or street drugs (such as marijuana or cocaine) while you are pregnant  Safety tips:   Avoid hot tubs and saunas  Do not use a hot tub or sauna while you are pregnant, especially during your first trimester  Hot tubs and saunas may raise your baby's temperature and increase the risk of birth defects  Avoid toxoplasmosis  This is an infection caused by eating raw meat or being around infected cat feces  It can cause birth defects, miscarriages, and other problems  Wash your hands after you touch raw meat  Make sure any meat is well-cooked before you eat it  Avoid raw eggs and unpasteurized milk   Use gloves or ask someone else to clean your cat's litter box while you are pregnant  Ask your healthcare provider about travel  The most comfortable time to travel is during the second trimester  Ask your provider if you can travel after 36 weeks  You may not be able to travel in an airplane after 36 weeks  He or she may also recommend you avoid long road trips  Changes happening with your baby:  By 38 weeks, your baby may weigh between 6 and 9 pounds  Your baby may be about 14 inches long from the top of the head to the rump (baby's bottom)  Your baby hears well enough to know your voice  As your baby gets larger, you may feel fewer kicks and more stretching and rolling  Your baby may move into a head-down position  Your baby will also rest lower in your abdomen  What you need to know about prenatal care: Your healthcare provider will check your blood pressure and weight  You may also need the following:  A urine test  may also be done to check for sugar and protein  These can be signs of gestational diabetes or infection  Protein in your urine may also be a sign of preeclampsia  Preeclampsia is a condition that can develop during week 20 or later of your pregnancy  It causes high blood pressure, and it can cause problems with your kidneys and other organs  A gestational diabetes screen  may be done  Your healthcare provider may order either a 1-step or 2-step oral glucose tolerance test (OGTT)  1-step OGTT:  Your blood sugar level will be tested after you have not eaten for 8 hours (fasting)  You will then be given a glucose drink  Your level will be tested again 1 hour and 2 hours after you finish the drink  2-step OGTT:  You do not have to fast for the first part of the test  You will have the glucose drink at any time of day  Your blood sugar level will be checked 1 hour later  If your blood sugar is higher than a certain level, another test will be ordered  You will fast and your blood sugar level will be tested  You will have the glucose drink   Your blood will be tested again 1 hour, 2 hours, and 3 hours after you finish the glucose drink  A blood test  may be done to check for anemia (low iron level)  A Tdap vaccine  may be recommended by your healthcare provider  A group B strep test  is a test that is done to check for group B strep infection  Group B strep is a type of bacteria that may be found in the vagina or rectum  It can be passed to your baby during delivery if you have it  Your healthcare provider will take swab your vagina or rectum and send the sample to the lab for tests  Fundal height  is a measurement of your uterus to check your baby's growth  This number is usually the same as the number of weeks that you have been pregnant  Your healthcare provider may also check your baby's position  Your baby's heart rate  will be checked  Follow up with your obstetrician as directed:  Write down your questions so you remember to ask them during your visits  © [employer] 2022 Information is for End User's use only and may not be sold, redistributed or otherwise used for commercial purposes  The above information is an  only  It is not intended as medical advice for individual conditions or treatments  Talk to your doctor, nurse or pharmacist before following any medical regimen to see if it is safe and effective for you  Induction of Labor    What is Induction of Labor? Induction of labor is when labor is started (induced) before it begins on its own  Medicines and other methods are used to start contractions and help your cervix soften, thin (efface), and dilate (open)  You may be given antibiotics to fight a bacterial infection you have or prevent an infection during delivery  There are two types:  When labor is started due to a medical problem you or the baby have, this is called an indicated induction  This may happen if the risk of a longer pregnancy is greater then delivering the baby early    If labor is started for convenience it is called an elective induction  When can you have an elective induction? Before a decision can be made for an elective induction of labor, you healthcare provider must assess the following information about your pregnancy:  Due date  At least 39 weeks pregnant  Is your cervix showing signs of being ready for labor? Confirm that you have not had a previous  section or major surgery on your uterus  How is labor induced? There are several ways to induce labor  Your doctor will choose what's best for you  Stripping membranes   The doctor uses the tip of a finger to separate the amniotic membrane from the wall of your uterus while checking on your cervix  This causes your body to release hormones called prostaglandins  Prostaglandins help the cervix become soft, thin and ready for labor  Breaking your water  The doctor uses a plastic hook to make a small hole in the amniotic sac (bag of water)  Glass Balloon  This is a small catheter with a balloon on the end which is inserted into the vagina  The balloon is inflated with saline to help the cervix expand  The intention is to mechanically dilate the cervix to a point where it is safe to start Pitocin  You may be out of bed with this method  In fact it is encouraged since gravity will assist in moving the balloon down through your cervix  Medications  Cytotec  This is used when your cervix is still closed and thick  It is a small tablet inserted into your vagina by the physician  This tablet may be inserted every three hours as needed until your cervix opens and thins  After the medication is placed you will be on the fetal heart monitor for a period of time  You will need to be in bed for this  After monitoring is over you will be allowed to walk or sit in a chair   The fetal monitoring can be performed intermittently thereafter, unless you go into active labor or there is a change in your or the baby's condition  Pitocin  This is an IV medication that is administered slowly through a pump and increased in small increments until a productive pattern of labor is achieved  The goal is to cause your contractions to begin and to stay strong and regular  Your baby will need to be constantly monitored on this medication  You may either be in the bed, a chair or on the birthing ball, as long as the baby's heartrate can be monitored safely  What are the risks of an induction? The baby may need to go to the  Intensive Care Unit (NICU)  The baby's lungs may not be mature  The baby may have trouble with control of body temperature  You may have a longer labor  You have an increased risk of having a  section  Medicines used to induce labor may cause too many contractions  This can lower your baby's heartbeat and decrease his or her oxygen supply  Induction of labor may also increase the risk of umbilical cord prolapse  This condition causes the umbilical cord to slip into the vagina before delivery  It can compress the cord and decrease your baby's oxygen supply  If this were to happen, you will need a   Medical induction may cause an infection in you or your baby  Preparing for your Induction    Overnight Induction    When you are scheduled for an overnight induction, it is most likely because your cervix is closed  Your body is not ready for labor and therefore you may need medication overnight to prepare your cervix for the Pitocin in the morning  This type of induction can take a day, or more of labor  This is normal  We are asking  your body do something it is not quite ready to do yet  For an overnight induction please eat a regular dinner before you come in  Once you arrive at the hospital you will be on a clear liquid diet until you deliver the baby  That will consist of juices (not orange juice), soda, broths, popsicles, Jello and water ice       Morning Induction  Eat a regular diet up until 2 hours before your induction  Ater that, only clear fluids (see above)  Please be prepared for an induction to take a day or two until you deliver your baby  As mentioned earlier, we are asking your body to do something it is not quite ready to do yet

## 2023-06-20 ENCOUNTER — HOSPITAL ENCOUNTER (OUTPATIENT)
Dept: LABOR AND DELIVERY | Facility: HOSPITAL | Age: 33
Discharge: HOME | End: 2023-06-20
Payer: COMMERCIAL

## 2023-06-20 ENCOUNTER — HOSPITAL ENCOUNTER (INPATIENT)
Facility: HOSPITAL | Age: 33
LOS: 2 days | Discharge: HOME | End: 2023-06-22
Attending: OBSTETRICS & GYNECOLOGY | Admitting: OBSTETRICS & GYNECOLOGY
Payer: COMMERCIAL

## 2023-06-20 DIAGNOSIS — F19.91 HISTORY OF RECREATIONAL DRUG USE: ICD-10-CM

## 2023-06-20 DIAGNOSIS — Z3A.39 39 WEEKS GESTATION OF PREGNANCY: ICD-10-CM

## 2023-06-20 LAB
ABO GROUP BLD: NORMAL
ALBUMIN SERPL BCP-MCNC: 3.3 G/DL (ref 3.5–5)
ALP SERPL-CCNC: 124 U/L (ref 34–104)
ALT SERPL W P-5'-P-CCNC: 10 U/L (ref 7–52)
AMPHETAMINES SERPL QL SCN: NEGATIVE
ANION GAP SERPL CALCULATED.3IONS-SCNC: 9 MMOL/L
AST SERPL W P-5'-P-CCNC: 16 U/L (ref 13–39)
BARBITURATES UR QL: NEGATIVE
BENZODIAZ UR QL: NEGATIVE
BILIRUB SERPL-MCNC: 0.2 MG/DL (ref 0.2–1)
BLD GP AB SCN SERPL QL: NEGATIVE
BUN SERPL-MCNC: 16 MG/DL (ref 5–25)
CALCIUM ALBUM COR SERPL-MCNC: 8.8 MG/DL (ref 8.3–10.1)
CALCIUM SERPL-MCNC: 8.2 MG/DL (ref 8.4–10.2)
CHLORIDE SERPL-SCNC: 106 MMOL/L (ref 96–108)
CO2 SERPL-SCNC: 20 MMOL/L (ref 21–32)
COCAINE UR QL: NEGATIVE
CREAT SERPL-MCNC: 0.82 MG/DL (ref 0.6–1.3)
ERYTHROCYTE [DISTWIDTH] IN BLOOD BY AUTOMATED COUNT: 13.2 % (ref 11.6–15.1)
GFR SERPL CREATININE-BSD FRML MDRD: 94 ML/MIN/1.73SQ M
GLUCOSE SERPL-MCNC: 98 MG/DL (ref 65–140)
HCT VFR BLD AUTO: 31.9 % (ref 34.8–46.1)
HGB BLD-MCNC: 10.4 G/DL (ref 11.5–15.4)
HOLD SPECIMEN: YES
MCH RBC QN AUTO: 29.7 PG (ref 26.8–34.3)
MCHC RBC AUTO-ENTMCNC: 32.6 G/DL (ref 31.4–37.4)
MCV RBC AUTO: 91 FL (ref 82–98)
METHADONE UR QL: NEGATIVE
OPIATES UR QL SCN: NEGATIVE
OXYCODONE+OXYMORPHONE UR QL SCN: NEGATIVE
PCP UR QL: NEGATIVE
PLATELET # BLD AUTO: 262 THOUSANDS/UL (ref 149–390)
PMV BLD AUTO: 11.9 FL (ref 8.9–12.7)
POTASSIUM SERPL-SCNC: 4 MMOL/L (ref 3.5–5.3)
PROT SERPL-MCNC: 6.6 G/DL (ref 6.4–8.4)
RBC # BLD AUTO: 3.5 MILLION/UL (ref 3.81–5.12)
RH BLD: POSITIVE
SODIUM SERPL-SCNC: 135 MMOL/L (ref 135–147)
SPECIMEN EXPIRATION DATE: NORMAL
THC UR QL: NEGATIVE
WBC # BLD AUTO: 9.21 THOUSAND/UL (ref 4.31–10.16)

## 2023-06-20 PROCEDURE — 80053 COMPREHEN METABOLIC PANEL: CPT

## 2023-06-20 PROCEDURE — 86901 BLOOD TYPING SEROLOGIC RH(D): CPT

## 2023-06-20 PROCEDURE — 4A1HXCZ MONITORING OF PRODUCTS OF CONCEPTION, CARDIAC RATE, EXTERNAL APPROACH: ICD-10-PCS | Performed by: OBSTETRICS & GYNECOLOGY

## 2023-06-20 PROCEDURE — 86900 BLOOD TYPING SEROLOGIC ABO: CPT

## 2023-06-20 PROCEDURE — 3E0DXGC INTRODUCTION OF OTHER THERAPEUTIC SUBSTANCE INTO MOUTH AND PHARYNX, EXTERNAL APPROACH: ICD-10-PCS | Performed by: OBSTETRICS & GYNECOLOGY

## 2023-06-20 PROCEDURE — 85027 COMPLETE CBC AUTOMATED: CPT

## 2023-06-20 PROCEDURE — 84156 ASSAY OF PROTEIN URINE: CPT

## 2023-06-20 PROCEDURE — NC001 PR NO CHARGE: Performed by: OBSTETRICS & GYNECOLOGY

## 2023-06-20 PROCEDURE — 86850 RBC ANTIBODY SCREEN: CPT

## 2023-06-20 PROCEDURE — 80307 DRUG TEST PRSMV CHEM ANLYZR: CPT

## 2023-06-20 PROCEDURE — 82570 ASSAY OF URINE CREATININE: CPT

## 2023-06-20 PROCEDURE — 86780 TREPONEMA PALLIDUM: CPT

## 2023-06-20 RX ORDER — ONDANSETRON 2 MG/ML
4 INJECTION INTRAMUSCULAR; INTRAVENOUS EVERY 6 HOURS PRN
Status: DISCONTINUED | OUTPATIENT
Start: 2023-06-20 | End: 2023-06-22 | Stop reason: HOSPADM

## 2023-06-20 RX ORDER — SODIUM CHLORIDE, SODIUM LACTATE, POTASSIUM CHLORIDE, CALCIUM CHLORIDE 600; 310; 30; 20 MG/100ML; MG/100ML; MG/100ML; MG/100ML
125 INJECTION, SOLUTION INTRAVENOUS CONTINUOUS
Status: DISCONTINUED | OUTPATIENT
Start: 2023-06-20 | End: 2023-06-22 | Stop reason: HOSPADM

## 2023-06-20 RX ORDER — HYDROXYZINE HYDROCHLORIDE 25 MG/1
25 TABLET, FILM COATED ORAL EVERY 6 HOURS PRN
Status: DISCONTINUED | OUTPATIENT
Start: 2023-06-20 | End: 2023-06-22 | Stop reason: HOSPADM

## 2023-06-20 RX ORDER — BUPIVACAINE HYDROCHLORIDE 2.5 MG/ML
30 INJECTION, SOLUTION EPIDURAL; INFILTRATION; INTRACAUDAL ONCE AS NEEDED
Status: DISCONTINUED | OUTPATIENT
Start: 2023-06-20 | End: 2023-06-22 | Stop reason: HOSPADM

## 2023-06-20 RX ADMIN — Medication 50 MCG: at 22:40

## 2023-06-21 ENCOUNTER — ANESTHESIA (INPATIENT)
Dept: ANESTHESIOLOGY | Facility: HOSPITAL | Age: 33
End: 2023-06-21
Payer: COMMERCIAL

## 2023-06-21 ENCOUNTER — ANESTHESIA EVENT (INPATIENT)
Dept: ANESTHESIOLOGY | Facility: HOSPITAL | Age: 33
End: 2023-06-21
Payer: COMMERCIAL

## 2023-06-21 LAB
BASE EXCESS BLDCOA CALC-SCNC: -5.7 MMOL/L (ref 3–11)
BASE EXCESS BLDCOV CALC-SCNC: -4.3 MMOL/L (ref 1–9)
CREAT UR-MCNC: 75.3 MG/DL
HCO3 BLDCOA-SCNC: 20.3 MMOL/L (ref 17.3–27.3)
HCO3 BLDCOV-SCNC: 19.8 MMOL/L (ref 12.2–28.6)
O2 CT VFR BLDCOA CALC: 11.8 ML/DL
OXYHGB MFR BLDCOA: 50.4 %
OXYHGB MFR BLDCOV: 71.7 %
PCO2 BLDCOA: 41.4 MM[HG] (ref 30–60)
PCO2 BLDCOV: 33.9 MM HG (ref 27–43)
PH BLDCOA: 7.31 [PH] (ref 7.23–7.43)
PH BLDCOV: 7.38 [PH] (ref 7.19–7.49)
PO2 BLDCOA: 21.7 MM HG (ref 5–25)
PO2 BLDCOV: 29 MM HG (ref 15–45)
PROT UR-MCNC: 8 MG/DL
PROT/CREAT UR: 0.11 MG/G{CREAT} (ref 0–0.1)
SAO2 % BLDCOV: 16.3 ML/DL
TREPONEMA PALLIDUM IGG+IGM AB [PRESENCE] IN SERUM OR PLASMA BY IMMUNOASSAY: NORMAL

## 2023-06-21 PROCEDURE — 0UQGXZZ REPAIR VAGINA, EXTERNAL APPROACH: ICD-10-PCS | Performed by: OBSTETRICS & GYNECOLOGY

## 2023-06-21 PROCEDURE — 59400 OBSTETRICAL CARE: CPT | Performed by: OBSTETRICS & GYNECOLOGY

## 2023-06-21 PROCEDURE — 10907ZC DRAINAGE OF AMNIOTIC FLUID, THERAPEUTIC FROM PRODUCTS OF CONCEPTION, VIA NATURAL OR ARTIFICIAL OPENING: ICD-10-PCS | Performed by: OBSTETRICS & GYNECOLOGY

## 2023-06-21 PROCEDURE — 82805 BLOOD GASES W/O2 SATURATION: CPT | Performed by: OBSTETRICS & GYNECOLOGY

## 2023-06-21 PROCEDURE — 3E033VJ INTRODUCTION OF OTHER HORMONE INTO PERIPHERAL VEIN, PERCUTANEOUS APPROACH: ICD-10-PCS | Performed by: OBSTETRICS & GYNECOLOGY

## 2023-06-21 PROCEDURE — NC001 PR NO CHARGE: Performed by: OBSTETRICS & GYNECOLOGY

## 2023-06-21 RX ORDER — DOCUSATE SODIUM 100 MG/1
100 CAPSULE, LIQUID FILLED ORAL 2 TIMES DAILY
Status: DISCONTINUED | OUTPATIENT
Start: 2023-06-21 | End: 2023-06-22 | Stop reason: HOSPADM

## 2023-06-21 RX ORDER — OXYTOCIN/RINGER'S LACTATE 30/500 ML
250 PLASTIC BAG, INJECTION (ML) INTRAVENOUS ONCE
Status: COMPLETED | OUTPATIENT
Start: 2023-06-21 | End: 2023-06-21

## 2023-06-21 RX ORDER — OXYCODONE HYDROCHLORIDE 5 MG/1
5 TABLET ORAL
Status: DISCONTINUED | OUTPATIENT
Start: 2023-06-21 | End: 2023-06-22 | Stop reason: HOSPADM

## 2023-06-21 RX ORDER — NALOXONE HYDROCHLORIDE 0.4 MG/ML
0.1 INJECTION, SOLUTION INTRAMUSCULAR; INTRAVENOUS; SUBCUTANEOUS ONCE
Status: DISCONTINUED | OUTPATIENT
Start: 2023-06-21 | End: 2023-06-22 | Stop reason: HOSPADM

## 2023-06-21 RX ORDER — IBUPROFEN 600 MG/1
600 TABLET ORAL EVERY 6 HOURS
Status: DISCONTINUED | OUTPATIENT
Start: 2023-06-21 | End: 2023-06-22 | Stop reason: HOSPADM

## 2023-06-21 RX ORDER — DIPHENHYDRAMINE HCL 25 MG
25 TABLET ORAL EVERY 6 HOURS PRN
Status: DISCONTINUED | OUTPATIENT
Start: 2023-06-21 | End: 2023-06-22 | Stop reason: HOSPADM

## 2023-06-21 RX ORDER — TERBUTALINE SULFATE 1 MG/ML
INJECTION, SOLUTION SUBCUTANEOUS
Status: COMPLETED
Start: 2023-06-21 | End: 2023-06-21

## 2023-06-21 RX ORDER — METOCLOPRAMIDE HYDROCHLORIDE 5 MG/ML
10 INJECTION INTRAMUSCULAR; INTRAVENOUS EVERY 6 HOURS PRN
Status: DISCONTINUED | OUTPATIENT
Start: 2023-06-21 | End: 2023-06-21

## 2023-06-21 RX ORDER — OXYTOCIN/RINGER'S LACTATE 30/500 ML
PLASTIC BAG, INJECTION (ML) INTRAVENOUS
Status: COMPLETED
Start: 2023-06-21 | End: 2023-06-21

## 2023-06-21 RX ORDER — ROPIVACAINE HYDROCHLORIDE 5 MG/ML
INJECTION, SOLUTION EPIDURAL; INFILTRATION; PERINEURAL AS NEEDED
Status: DISCONTINUED | OUTPATIENT
Start: 2023-06-21 | End: 2023-06-21 | Stop reason: HOSPADM

## 2023-06-21 RX ORDER — DIAPER,BRIEF,INFANT-TODD,DISP
1 EACH MISCELLANEOUS DAILY PRN
Status: DISCONTINUED | OUTPATIENT
Start: 2023-06-21 | End: 2023-06-22 | Stop reason: HOSPADM

## 2023-06-21 RX ORDER — ROPIVACAINE HYDROCHLORIDE 2 MG/ML
INJECTION, SOLUTION EPIDURAL; INFILTRATION; PERINEURAL CONTINUOUS PRN
Status: DISCONTINUED | OUTPATIENT
Start: 2023-06-21 | End: 2023-06-21 | Stop reason: HOSPADM

## 2023-06-21 RX ORDER — CALCIUM CARBONATE 500 MG/1
1000 TABLET, CHEWABLE ORAL DAILY PRN
Status: DISCONTINUED | OUTPATIENT
Start: 2023-06-21 | End: 2023-06-22 | Stop reason: HOSPADM

## 2023-06-21 RX ORDER — LIDOCAINE HYDROCHLORIDE AND EPINEPHRINE 20; 5 MG/ML; UG/ML
INJECTION, SOLUTION EPIDURAL; INFILTRATION; INTRACAUDAL; PERINEURAL AS NEEDED
Status: DISCONTINUED | OUTPATIENT
Start: 2023-06-21 | End: 2023-06-21 | Stop reason: HOSPADM

## 2023-06-21 RX ORDER — ACETAMINOPHEN 325 MG/1
650 TABLET ORAL EVERY 6 HOURS PRN
Status: DISCONTINUED | OUTPATIENT
Start: 2023-06-21 | End: 2023-06-22 | Stop reason: HOSPADM

## 2023-06-21 RX ORDER — METOCLOPRAMIDE HYDROCHLORIDE 5 MG/ML
10 INJECTION INTRAMUSCULAR; INTRAVENOUS EVERY 6 HOURS PRN
Status: DISCONTINUED | OUTPATIENT
Start: 2023-06-21 | End: 2023-06-22 | Stop reason: HOSPADM

## 2023-06-21 RX ORDER — SIMETHICONE 80 MG
80 TABLET,CHEWABLE ORAL 4 TIMES DAILY PRN
Status: DISCONTINUED | OUTPATIENT
Start: 2023-06-21 | End: 2023-06-22 | Stop reason: HOSPADM

## 2023-06-21 RX ADMIN — ROPIVACAINE HYDROCHLORIDE 10 ML/HR: 2 INJECTION, SOLUTION EPIDURAL; INFILTRATION at 02:33

## 2023-06-21 RX ADMIN — LIDOCAINE HYDROCHLORIDE,EPINEPHRINE BITARTRATE 5 ML: 20; .005 INJECTION, SOLUTION EPIDURAL; INFILTRATION; INTRACAUDAL; PERINEURAL at 02:28

## 2023-06-21 RX ADMIN — ROPIVACAINE HYDROCHLORIDE: 2 INJECTION, SOLUTION EPIDURAL; INFILTRATION at 03:00

## 2023-06-21 RX ADMIN — SODIUM CHLORIDE, SODIUM LACTATE, POTASSIUM CHLORIDE, AND CALCIUM CHLORIDE 125 ML/HR: .6; .31; .03; .02 INJECTION, SOLUTION INTRAVENOUS at 11:09

## 2023-06-21 RX ADMIN — Medication 250 UNITS: at 13:03

## 2023-06-21 RX ADMIN — DOCUSATE SODIUM 100 MG: 100 CAPSULE, LIQUID FILLED ORAL at 18:01

## 2023-06-21 RX ADMIN — ROPIVACAINE HYDROCHLORIDE 5 ML: 5 INJECTION, SOLUTION EPIDURAL; INFILTRATION; PERINEURAL at 02:35

## 2023-06-21 RX ADMIN — SODIUM CHLORIDE, SODIUM LACTATE, POTASSIUM CHLORIDE, AND CALCIUM CHLORIDE 999 ML/HR: .6; .31; .03; .02 INJECTION, SOLUTION INTRAVENOUS at 01:27

## 2023-06-21 RX ADMIN — IBUPROFEN 600 MG: 600 TABLET, FILM COATED ORAL at 14:25

## 2023-06-21 RX ADMIN — ROPIVACAINE HYDROCHLORIDE: 2 INJECTION, SOLUTION EPIDURAL; INFILTRATION at 11:07

## 2023-06-21 RX ADMIN — TERBUTALINE SULFATE 1 MG: 1 INJECTION, SOLUTION SUBCUTANEOUS at 02:42

## 2023-06-21 RX ADMIN — SODIUM CHLORIDE, SODIUM LACTATE, POTASSIUM CHLORIDE, AND CALCIUM CHLORIDE 125 ML/HR: .6; .31; .03; .02 INJECTION, SOLUTION INTRAVENOUS at 03:41

## 2023-06-21 RX ADMIN — ROPIVACAINE HYDROCHLORIDE 5 ML: 5 INJECTION, SOLUTION EPIDURAL; INFILTRATION; PERINEURAL at 02:29

## 2023-06-21 RX ADMIN — ACETAMINOPHEN 650 MG: 325 TABLET ORAL at 19:34

## 2023-06-21 RX ADMIN — METOCLOPRAMIDE 10 MG: 5 INJECTION, SOLUTION INTRAMUSCULAR; INTRAVENOUS at 09:47

## 2023-06-21 RX ADMIN — IBUPROFEN 600 MG: 600 TABLET, FILM COATED ORAL at 21:59

## 2023-06-21 RX ADMIN — SODIUM CHLORIDE, SODIUM LACTATE, POTASSIUM CHLORIDE, AND CALCIUM CHLORIDE 999 ML/HR: .6; .31; .03; .02 INJECTION, SOLUTION INTRAVENOUS at 02:40

## 2023-06-21 RX ADMIN — ONDANSETRON 4 MG: 2 INJECTION INTRAMUSCULAR; INTRAVENOUS at 04:12

## 2023-06-21 RX ADMIN — BENZOCAINE AND LEVOMENTHOL 1 APPLICATION: 200; 5 SPRAY TOPICAL at 14:25

## 2023-06-21 NOTE — ASSESSMENT & PLAN NOTE
Used for many years with intermittent sobriety, last use at the beginning of this pregnancy  Attends weekly group therapy  UDS neg on admission

## 2023-06-21 NOTE — OB LABOR/OXYTOCIN SAFETY PROGRESS
Labor Progress Note - Soren Wade 34 y o  female MRN: [id number]    Unit/Bed#: L&D 322-01 Encounter: [id number]       Contraction Frequency (minutes): 2-4  Contraction Quality: Moderate  Tachysystole: No   Cervical Dilation: Lip/rim (Comment)        Cervical Effacement: 100  Fetal Station: 0  Baseline Rate: 125 bpm  Fetal Heart Rate: 85 BPM  FHR Category: Category I               Vital Signs:   Vitals:    06/21/23 0800   BP:    Pulse:    Resp:    Temp: 98 2 °F (36 8 °C)   SpO2:        Notes/comments:   Pt feeling some more pressure in her sacral area/rectum  SVE as above  Will change positions with peanut ball to facilitate further fetal descent   20000 Novelo category I         Sid Alex MD 6/21/2023 9:38 AM

## 2023-06-21 NOTE — OB LABOR/OXYTOCIN SAFETY PROGRESS
"Labor Progress Note - Barrera Leonard 34 y o  female MRN: [id number]    Unit/Bed#: L&D 322-01 Encounter: [id number]       Contraction Frequency (minutes): 2-3  Contraction Quality: Moderate  Tachysystole: No   Cervical Dilation: 10  Dilation Complete Date: 06/21/23  Dilation Complete Time: 1148  Cervical Effacement: 100  Fetal Station: 0  Baseline Rate: 160 bpm  Fetal Heart Rate: 85 BPM  FHR Category: Category I               Vital Signs:   Vitals:    06/21/23 0800   BP:    Pulse:    Resp:    Temp: 98 2 °F (36 8 °C)   SpO2:        Notes/comments:   Pt comfortable  SVE as above, complete  Temp noted to be elevated 102 7F  pt reports feeling slightly \"achey\" but overall comfortable  FHR baseline 160 which has increased from previously  Given current isolated maternal fever without associated fetal tachycardia, will recheck temp in 30 mins and if persistently elevated or develops fetal tachycardia will likely treat for suspected chorioamnionitis  Pushing efforts discussed, will commence  FHT currently category I           Dayna Guadalupe MD 6/21/2023 11:49 AM    "

## 2023-06-21 NOTE — OB LABOR/OXYTOCIN SAFETY PROGRESS
Labor Progress Note - Romario Cabrales 34 y o  female MRN: [id number]    Unit/Bed#: L&D 322-01 Encounter: [id number]       Contraction Frequency (minutes): 2-3  Contraction Quality: Moderate  Tachysystole: No   Cervical Dilation: 9        Cervical Effacement: 100  Fetal Station: -1  Baseline Rate: 125 bpm  Fetal Heart Rate: 85 BPM  FHR Category: Category I               Vital Signs:   Vitals:    06/21/23 0800   BP:    Pulse:    Resp:    Temp: 98 2 °F (36 8 °C)   SpO2:        Notes/comments:  Came to evaluate patient, she is now very comfortable s/p epidural  On exam membranes grossly ruptured for clear fluid  SVE as above with forebag which was also ruptured for clear fluid  FHT category I          Veronica Bradley MD 6/21/2023 8:25 AM

## 2023-06-21 NOTE — ANESTHESIA PROCEDURE NOTES
Epidural Block    Patient location during procedure: OB  Reason for block: procedure for pain, at surgeon's request and primary anesthetic  Staffing  Performed by: Eliot Ospina DO  Authorized by: Eliot Ospina DO    Preanesthetic Checklist  Completed: patient identified, IV checked, site marked, risks and benefits discussed, surgical consent, monitors and equipment checked, pre-op evaluation and timeout performed  Epidural  Patient position: sitting  Prep: Betadine  Patient monitoring: frequent blood pressure checks  Approach: midline  Location: lumbar  Injection technique: MIRTA saline  Needle  Needle type: Tuohy   Needle gauge: 18 G  Catheter at skin depth: 10 cm  Catheter securement method: clear occlusive dressing  Test dose: negative  Assessment  Number of attempts: 1negative aspiration for CSF, negative aspiration for heme and no paresthesia on injection  patient tolerated the procedure well with no immediate complications

## 2023-06-21 NOTE — DISCHARGE SUMMARY
Discharge Summary - OB/GYN  Ankita Salazar 34 y o  female MRN: [id number]  Unit/Bed#: L&D 322-01 Encounter: [id number]    Admission Date: 2023     Discharge Date: 2023    Admitting Attending: Jean Dumont MD    Delivering Attending: Dr Cindi Ramos    Discharging Attending: Dr Obinna Williamson    Diagnosis:   1  Pregnancy at 39 weeks 4 days  2  History of recreational drug use, last at beginning of pregnancy with negative UDS on admission  3  Pyelectasis of fetus on prenatal ultrasound    Procedures: spontaneous vaginal delivery    Anesthesia: epidural    Hospital course: Ms Ankita Salazar is a 34 y o  G 2 P 0010 at 39 wk forelective induction of labor  On exam in triage she was noted to be 1 5/50/-3; Her FHT had baseline 125 and was reactive  She was admitted for elective induction of labor with Glass balloon and Cytotec  The Glass balloon was expelled and the patient received an epidural for pain control  During her labor course, the patient was noted to have a prolonged deceleration with dae in the 90s lasting 5 minutes; for this she received terbutaline  FHT returned to baseline  She spontaneously ruptured for clear fluid  Her course was also complicated by an elevated maternal temperature of 102 7; not persistent  She made change to complete and began pushing  She delivered a viable male  on 2023 at 1303  Weight 7lbs 0 3oz via normal spontaneous vaginal delivery  She sustained a superficial left vaginal laceration during delivery which was adequately repaired  Apgars were 9 (1 min) and 9 (5 min)   was transferred to  nursery  Patient tolerated the procedure well  She received a diagnosis of gestational hypertension during her labor course; she did not have elevated blood pressures prior to discharge  Her post-delivery course was uncomplicated  Her postpartum pain was well controlled with oral analgesics      On day of discharge, she was ambulating and able to reasonably perform all ADLs  She was voiding and had appropriate bowel function  Pain was well controlled  She was discharged home on postpartum day #1 without complications  Patient was instructed to follow up with her OB as an outpatient and was given appropriate warnings to call provider if she develops signs of infection or uncontrolled pain  Complications: none apparent    Condition at discharge: good     Discharge instructions/medications/information to patient and family:   See after visit summary for information provided to patient and family  Provisions for Follow-Up Care:  See after visit summary for information related to follow-up care and any pertinent home health orders  Disposition: See After Visit Summary for discharge disposition information      Planned Readmission: No

## 2023-06-21 NOTE — H&P
H&P Exam - Obstetrics   Tabatha Harris 34 y o  female MRN: [id number]  Unit/Bed#: L&D 322-01 Encounter: [id number]      ASSESSMENT:  35 yo  at 39w3d weeks gestation who is being admitted for eIOL  EFW: 2113 grams - 4 lbs 11 oz   (64%) on   VTX by transabdominal ultrasound     PLAN:  * 39 weeks gestation of pregnancy  Assessment & Plan  Admit  Follow up CBC, RPR, Blood Type  Start with otero balloon and cytotec  GBS  status: neg  Analgesia and/or epidural at patient request  Anticipate     History of recreational drug use  Assessment & Plan  Used for many years with intermittent sobriety, last use at the beginning of this pregnancy  Attends weekly group therapy  UDS neg on admission    Pyelectasis of fetus on prenatal ultrasound  Assessment & Plan  Bilateral pyelectasis of 4 mm    Discussed with Dr Ary Torres    This patient will be an INPATIENT  and I certify the anticipated length of stay is >2 Midnights  History of Present Illness     Chief Complaint: Induction of labor    HPI:  Tabatha Harris is a 34 y o   female with an ELVIRA of 2023, by Ultrasound at 39w3d weeks gestation who is being admitted for eIOL  Contractions: occasional  Loss of fluid: no  Vaginal bleeding: no  Fetal movement: yes    She is a C patient  PREGNANCY COMPLICATIONS:   1) As above    OB History    Para Term  AB Living   2       1 0   SAB IAB Ectopic Multiple Live Births     1            # Outcome Date GA Lbr John/2nd Weight Sex Delivery Anes PTL Lv   2 Current            1 IAB                Baby complications/comments: Bilateral fetal pyelectasis of 4mm     Review of Systems   Constitutional: Negative for chills and fever  Eyes: Negative for visual disturbance  Respiratory: Negative for chest tightness and shortness of breath  Cardiovascular: Negative for chest pain and palpitations  Gastrointestinal: Negative for abdominal pain     Genitourinary: Negative for vaginal "bleeding, vaginal discharge and vaginal pain  Musculoskeletal: Negative for back pain  Neurological: Negative for headaches  Historical Information   Past Medical History:   Diagnosis Date   • Abnormal Pap smear of cervix 12/30   • Breast discharge 1/15/23   • Breast mass 1/19/23   • Nasal septal perforation     secondary to cocaine use and digital trauma     Past Surgical History:   Procedure Laterality Date   • BREAST BIOPSY  1/25   • US GUIDED BREAST BIOPSY RIGHT COMPLETE Right 01/25/2023   • US GUIDED BREAST LYMPH NODE BIOPSY RIGHT Right 01/25/2023     Social History   Social History     Substance and Sexual Activity   Alcohol Use Yes    Comment: OCCASIONAL DURING PREGNANCY     Social History     Substance and Sexual Activity   Drug Use Yes   • Types: Cocaine    Comment: past usage ([id number])     Social History     Tobacco Use   Smoking Status Never   Smokeless Tobacco Never     Family History: non-contributory    Meds/Allergies      Medications Prior to Admission   Medication   • ferrous sulfate 325 (65 Fe) mg tablet   • Prenatal Vit-Fe Sulfate-FA-DHA (PRENATAL VITAMIN/MIN +DHA PO)        Allergies   Allergen Reactions   • Latex Rash       OBJECTIVE:    Vitals: Blood pressure 135/81, pulse 65, temperature 97 5 °F (36 4 °C), temperature source Oral, resp  rate 18, height 5' 5\" (1 651 m), weight 79 4 kg (175 lb), last menstrual period 09/22/2022  Body mass index is 29 12 kg/m²  Physical Exam  HENT:      Head: Normocephalic  Eyes:      Conjunctiva/sclera: Conjunctivae normal    Cardiovascular:      Rate and Rhythm: Normal rate  Pulses: Normal pulses  Pulmonary:      Effort: Pulmonary effort is normal    Abdominal:      Palpations: Abdomen is soft  Tenderness: There is no abdominal tenderness  Genitourinary:     General: Normal vulva  Skin:     General: Skin is warm  Neurological:      Mental Status: She is alert and oriented to person, place, and time     Psychiatric:         Mood " and Affect: Mood normal          Cervix:  1 5/50/-3    Fetal heart rate:   Baseline Rate: 125 bpm  Variability: Moderate 6-25 bpm  Accelerations: 15 x 15 or greater, At variable times  Decelerations: None    [place]:   Contraction Frequency (minutes): irregular  Contraction Duration (seconds):   Contraction Quality: Mild      Prenatal Labs:   Blood Type:   Lab Results   Component Value Date/Time    ABO Grouping O 06/20/2023 09:09 PM     , D (Rh type):   Lab Results   Component Value Date/Time    Rh Factor Positive 06/20/2023 09:09 PM     , Antibody Screen: neg  , HCT/HGB:   Lab Results   Component Value Date/Time    Hematocrit 31 9 (L) 06/20/2023 09:09 PM    Hemoglobin 10 4 (L) 06/20/2023 09:09 PM      , MCV:   Lab Results   Component Value Date/Time    MCV 91 06/20/2023 09:09 PM      , Platelets:   Lab Results   Component Value Date/Time    Platelets 310 32/59/9848 09:09 PM      , 1 hour Glucola:   Lab Results   Component Value Date/Time    Glucose 76 03/22/2023 09:46 AM    , Rubella:   Lab Results   Component Value Date/Time    Rubella IgG Quant 25 9 01/18/2023 08:29 AM        , VDRL/RPR:   Lab Results   Component Value Date/Time    RPR Non-Reactive 01/18/2023 08:29 AM      , Hep B:   Lab Results   Component Value Date/Time    Hepatitis B Surface Ag Non-reactive 01/18/2023 08:29 AM     , Hep C:neg    , HIV: non-reactive   , Chlamydia: neg   , Gonorrhea:   Lab Results   Component Value Date/Time    N gonorrhoeae, DNA Probe Negative 12/09/2022 03:20 PM     , Group B Strep:    Lab Results   Component Value Date/Time    Strep Grp B PCR Negative 06/01/2023 03:06 PM          Invasive Devices     Peripheral Intravenous Line  Duration           Peripheral IV 06/20/23 Left Antecubital <1 day                Jaun Madrid MD  PGY-2  6/20/2023  10:39 PM

## 2023-06-21 NOTE — ANESTHESIA POSTPROCEDURE EVALUATION
"Post-Op Assessment Note    CV Status:  Stable    Pain management: adequate     Mental Status:  Alert and awake   Hydration Status:  Euvolemic   PONV Controlled:  Controlled   Airway Patency:  Patent      Post Op Vitals Reviewed: Yes      Staff: Anesthesiologist     Post-op block assessment: catheter intact and no complications      No notable events documented      BP      Temp      Pulse     Resp      SpO2      /60   Pulse 79   Temp 98 3 °F (36 8 °C) (Oral)   Resp 18   Ht 5' 5\" (1 651 m)   Wt 79 4 kg (175 lb)   LMP 09/22/2022 (Approximate)   SpO2 100%   Breastfeeding Yes   BMI 29 12 kg/m²     "

## 2023-06-21 NOTE — L&D DELIVERY NOTE
Vaginal Delivery Summary - OB/GYN   Wendy Robins 34 y o  female MRN: [id number]  Unit/Bed#: L&D 322-01 Encounter: [id number]          Pre-delivery Diagnosis:   1  Pregnancy at 39 weeks 4 days  2  History of recreational drug use, last at beginning of pregnancy with negative UDS on admission  3  Pyelectasis of fetus on prenatal ultrasound    Post-delivery Diagnosis: same, delivered    Procedure: Spontaneous Vaginal Delivery    Attending: Dr Raymond Sawant    Assistant(s): Dr Roge Dahl    Anesthesia: Epidural    QBL: 40XF    Complications: none apparent    Specimens:   1  Arterial and venous cord gases  2  Cord blood  3  Segment of umbilical cord  4  Placenta to storage     Findings:  1  Viable male on 6/21/2023 at 1303, with APGARS of 9 and 9 at 1 and 5 minutes respectively,  2  Spontaneous delivery of intact placenta at 1307  3  Left vaginal laceration repaired with 3-0 Vicryl Rapide  4  Umbilical Cord Venous Blood Gas:  Results from last 7 days   Lab Units 06/21/23  1305   PH COV  7 384   PCO2 COV mm HG 33 9   HCO3 COV mmol/L 19 8   BASE EXC COV mmol/L -4 3*   O2 CT CD VB mL/dL 16 3   O2 HGB, VENOUS CORD % 71 7     5  Umbilical Cord Arterial Blood Gas:          Disposition:  Patient tolerated the procedure well and was recovering in labor and delivery room       Brief history and labor course:  Ms Wendy Robins is a 34 y o  G 2 P 0010 at 39 wk for d  She presented to labor and delivery for elective induction of labor  On exam in triage she was noted to be 1 5/50/-3; Her FHT had baseline 125 and was reactive  She was admitted for elective induction of labor with Glass balloon and Cytotec  The Glass balloon was expelled and the patient received an epidural for pain control  During her labor course, the patient was noted to have a prolonged deceleration with dae in the 90s lasting 5 minutes; for this she received terbutaline  FHT returned to baseline  She spontaneously ruptured for clear fluid    Her course was also complicated by an elevated maternal temperature of 102 7; not persistent  She made change to complete and began pushing  Description of procedure:    After pushing for 74 minutes, at 1303 patient delivered a viable male , wt pending, apgars of 9 (1 min) and 9 (5 min)  The fetal vertex delivered spontaneously  There was a tight nuchal cord through which the baby delivered  Baby was OA, restituted to MAYITO  The left anterior shoulder delivered atraumatically with maternal expulsive forces and the assistance of gentle downward traction  The right posterior shoulder delivered with maternal expulsive forces and the assistance of gentle upward traction  An additional loop of cord was loosely wrapped around the  trunk  The remainder of the fetus delivered spontaneously, and the loops of cord were reduced  Upon delivery, the infant was placed on the mothers abdomen and the cord was clamped and cut  The infant was noted to cry spontaneously and was moving all extremities appropriately  There was no evidence for injury  Awaiting nurse resuscitators evaluated the   Arterial and venous cord blood gases and cord blood was collected for analysis  These were promptly sent to the lab  In the immediate post-partum, 30 units of IV pitocin was administered, and the uterus was noted to contract down well with massage and pitocin  The placenta delivered spontaneously at 1307 and was noted to have a centrally inserted 3 vessel cord  The vagina, cervix, perineum, and rectum were inspected and there was noted to be a left vaginal laceration  Left vaginal laceration Repair  Patient was comfortable with epidural at that time  A superficial left vaginal Serratia was identified and required repair  Laceration was repaired in with a figure-of-8 of 3-0 Vicryl Rapide for reapproximation  Good reapproximation and hemostasis was confirmed at the conclusion of this procedure      At the conclusion of the procedure, all needle, sponge, and instrument counts were noted to be correct  Patient tolerated the procedure well and was allowed to recover in labor and delivery room with family and  before being transferred to the post-partum floor  Dr Favio Bullock was present and participated in all key portions of the case        Roselia Benoit MD  OB/GYN PGY-3  2023  1:22 PM

## 2023-06-21 NOTE — PLAN OF CARE
Problem: POSTPARTUM  Goal: Experiences normal postpartum course  Description: INTERVENTIONS:  - Monitor maternal vital signs  - Assess uterine involution and lochia  Outcome: Progressing  Goal: Appropriate maternal -  bonding  Description: INTERVENTIONS:  - Identify family support  - Assess for appropriate maternal/infant bonding   -Encourage maternal/infant bonding opportunities  - Referral to  or  as needed  Outcome: Progressing  Goal: Establishment of infant feeding pattern  Description: INTERVENTIONS:  - Assess breast/bottle feeding  - Refer to lactation as needed  Outcome: Progressing  Goal: Incision(s), wounds(s) or drain site(s) healing without S/S of infection  Description: INTERVENTIONS  - Assess and document dressing, incision, wound bed, drain sites and surrounding tissue  - Provide patient and family education  \  Outcome: Progressing

## 2023-06-21 NOTE — OB LABOR/OXYTOCIN SAFETY PROGRESS
Labor Progress Note - Clementina Ryder 34 y o  female MRN: [id number]    Unit/Bed#: L&D 322-01 Encounter: [id number]       Contraction Frequency (minutes): 2-4 5 (difficulty tracing due to maternal position; irritability noted)  Contraction Quality: Moderate  Tachysystole: No   Cervical Dilation: 7        Cervical Effacement: 80  Fetal Station: -1  Baseline Rate: 135 bpm  Fetal Heart Rate: 85 BPM              Vital Signs:   Vitals:    06/21/23 0420   BP: 97/53   Pulse: 78   Resp:    Temp:    SpO2:        Notes/comments:   Pt resting comfortably  SVE as above  FHT cat 1, contractions difficult to trace  Will continue expectant management at this time       Dr Kenji Salazar aware     Paolo Viveros MD 6/21/2023 5:12 AM

## 2023-06-21 NOTE — OB LABOR/OXYTOCIN SAFETY PROGRESS
Labor Progress Note - Lorie Moat 34 y o  female MRN: [id number]    Unit/Bed#: L&D 322-01 Encounter: [id number]       Contraction Frequency (minutes): 1-2 5  Contraction Quality: Moderate  Tachysystole: No   Cervical Dilation: 6        Cervical Effacement: 80  Fetal Station: -1  Baseline Rate: 125 bpm  Fetal Heart Rate: 135 BPM                Vital Signs:   Vitals:    06/20/23 2343   BP: 141/84   Pulse: 56   Resp:    Temp:        Notes/comments:   Pt received epidural  Noted to have prolonged deceleration with dae in the 90s, lasting 5 mins  Terbutaline given, IVF bolus started, maternal positioning changed from side to side and to hands and knees  BP 120s/50s  FHT noted to return to baseline with moderate variability after interventions  Will continue monitoring at this time given moderate variability      Dr Spencer Hampton in the room    Jojo Richardson MD 6/21/2023 2:59 AM

## 2023-06-21 NOTE — OB LABOR/OXYTOCIN SAFETY PROGRESS
Labor Progress Note - Shirin Loving 34 y o  female MRN: [id number]    Unit/Bed#: L&D 322-01 Encounter: [id number]       Contraction Frequency (minutes): 2-3  Contraction Quality: Moderate  Tachysystole: No   Cervical Dilation: 10  Dilation Complete Date: 23  Dilation Complete Time: 1148  Cervical Effacement: 100  Fetal Station: 1  Baseline Rate: 140 bpm  Fetal Heart Rate: 85 BPM  FHR Category: Category II               Vital Signs:   Vitals:    23 0800   BP:    Pulse:    Resp:    Temp: 98 2 °F (36 8 °C)   SpO2:        Notes/comments:   Pt pushing for approximately 30 mins  SVE as above, pushing to +2 station with good effort  Repeat temp normal, afebrile  No fetal tachycardia  FHT category II due to intermittent variable decelerations with pushing but maintains moderate variability and accelerations, overall reassuring features   Anticipate          Bunny Lucas MD 2023 12:30 PM

## 2023-06-21 NOTE — OB LABOR/OXYTOCIN SAFETY PROGRESS
Labor Progress Note - Jonelle Mcmillan 34 y o  female MRN: [id number]    Unit/Bed#: L&D 322-01 Encounter: [id number]       Contraction Frequency (minutes): 1-2  Contraction Quality: Mild  Tachysystole: No   Cervical Dilation: 4-5        Cervical Effacement: 70  Fetal Station: -2  Baseline Rate: 125 bpm  Fetal Heart Rate: 135 BPM              Vital Signs:   Vitals:    06/20/23 2257   BP: 141/93   Pulse: 58   Resp:    Temp:        Notes/comments:   Pt is feeling increasing pain with contractions  FB dislodged  SVE as above  FHT cat 1 yamila q1-4 mins  Desires epidural at this time  Nursing to notify anesthesia  Will continue expectant management at this time with plan for augmentation with AROM or pitocin if protracted dilation      Dr Kori Langford aware    Elisabet Austin MD 6/21/2023 1:47 AM

## 2023-06-21 NOTE — DISCHARGE INSTRUCTIONS

## 2023-06-21 NOTE — PLAN OF CARE
Problem: Knowledge Deficit  Goal: Verbalizes understanding of labor plan  Description: Assess patient/family/caregiver's baseline knowledge level and ability to understand information  Provide education via patient/family/caregiver's preferred learning method at appropriate level of understanding  1  Provide teaching at level of understanding  2  Provide teaching via preferred learning method(s)   6/21/2023 1309 by Allison Amaya RN  Outcome: Completed  6/21/2023 0749 by Allison Amaya RN  Outcome: Progressing     Problem: Labor & Delivery  Goal: Manages discomfort  Description: Assess and monitor for signs and symptoms of discomfort  Assess patient's pain level regularly and per hospital policy  Administer medications as ordered  Support use of nonpharmacological methods to help control pain such as distraction, imagery, relaxation, and application of heat and cold  Collaborate with interdisciplinary team and patient to determine appropriate pain management plan  1  Include patient in decisions related to comfort  2  Offer non-pharmacological pain management interventions  3  Report ineffective pain management to physician   6/21/2023 1309 by Allison Amaya RN  Outcome: Completed  6/21/2023 0749 by Allison Amaya RN  Outcome: Progressing  Goal: Patient vital signs are stable  Description: 1  Assess vital signs - vaginal delivery    6/21/2023 1309 by Allison Amaya RN  Outcome: Completed  6/21/2023 0749 by Allison Amaya RN  Outcome: Progressing

## 2023-06-21 NOTE — ANESTHESIA PREPROCEDURE EVALUATION
Procedure:  LABOR ANALGESIA    Relevant Problems   GYN   (+) 39 weeks gestation of pregnancy   (+) Supervision of other normal pregnancy, antepartum        Physical Exam    Airway    Mallampati score: I  TM Distance: >3 FB  Neck ROM: full     Dental       Cardiovascular  Rhythm: regular, Rate: normal, Cardiovascular exam normal    Pulmonary  Pulmonary exam normal     Other Findings        Anesthesia Plan  ASA Score- 2     Anesthesia Type- epidural with ASA Monitors  Additional Monitors:   Airway Plan:           Plan Factors-Exercise tolerance (METS): >4 METS  Chart reviewed  Existing labs reviewed  Patient summary reviewed  Patient is not a current smoker  Induction- intravenous  Postoperative Plan-     Informed Consent- Anesthetic plan and risks discussed with patient

## 2023-06-22 VITALS
BODY MASS INDEX: 29.16 KG/M2 | SYSTOLIC BLOOD PRESSURE: 115 MMHG | OXYGEN SATURATION: 98 % | HEIGHT: 65 IN | HEART RATE: 72 BPM | DIASTOLIC BLOOD PRESSURE: 82 MMHG | TEMPERATURE: 98.8 F | WEIGHT: 175 LBS | RESPIRATION RATE: 18 BRPM

## 2023-06-22 PROBLEM — O13.9 GESTATIONAL HYPERTENSION: Status: ACTIVE | Noted: 2023-06-22

## 2023-06-22 LAB
DME PARACHUTE DELIVERY DATE ACTUAL: NORMAL
DME PARACHUTE DELIVERY DATE REQUESTED: NORMAL
DME PARACHUTE DELIVERY NOTE: NORMAL
DME PARACHUTE ITEM DESCRIPTION: NORMAL
DME PARACHUTE ORDER STATUS: NORMAL
DME PARACHUTE SUPPLIER NAME: NORMAL
DME PARACHUTE SUPPLIER PHONE: NORMAL

## 2023-06-22 PROCEDURE — 99024 POSTOP FOLLOW-UP VISIT: CPT | Performed by: OBSTETRICS & GYNECOLOGY

## 2023-06-22 RX ORDER — IBUPROFEN 600 MG/1
600 TABLET ORAL EVERY 6 HOURS
Qty: 30 TABLET | Refills: 0 | OUTPATIENT
Start: 2023-06-22

## 2023-06-22 RX ORDER — CALCIUM CARBONATE 500 MG/1
1000 TABLET, CHEWABLE ORAL DAILY PRN
Refills: 0
Start: 2023-06-22 | End: 2023-07-03

## 2023-06-22 RX ORDER — ACETAMINOPHEN 325 MG/1
650 TABLET ORAL EVERY 6 HOURS PRN
Refills: 0
Start: 2023-06-22

## 2023-06-22 RX ORDER — DOCUSATE SODIUM 100 MG/1
100 CAPSULE, LIQUID FILLED ORAL 2 TIMES DAILY
Refills: 0
Start: 2023-06-22 | End: 2023-07-03

## 2023-06-22 RX ORDER — DIPHENHYDRAMINE HCL 25 MG
25 TABLET ORAL EVERY 6 HOURS PRN
Qty: 30 TABLET | Refills: 0
Start: 2023-06-22 | End: 2023-07-03

## 2023-06-22 RX ORDER — IBUPROFEN 600 MG/1
600 TABLET ORAL EVERY 6 HOURS
Qty: 30 TABLET | Refills: 0
Start: 2023-06-22 | End: 2023-07-03

## 2023-06-22 RX ADMIN — IBUPROFEN 600 MG: 600 TABLET, FILM COATED ORAL at 11:16

## 2023-06-22 RX ADMIN — DOCUSATE SODIUM 100 MG: 100 CAPSULE, LIQUID FILLED ORAL at 18:25

## 2023-06-22 RX ADMIN — DOCUSATE SODIUM 100 MG: 100 CAPSULE, LIQUID FILLED ORAL at 08:20

## 2023-06-22 RX ADMIN — IBUPROFEN 600 MG: 600 TABLET, FILM COATED ORAL at 04:14

## 2023-06-22 RX ADMIN — IBUPROFEN 600 MG: 600 TABLET, FILM COATED ORAL at 18:25

## 2023-06-22 NOTE — PROGRESS NOTES
"Progress Note - OB/GYN  Jonelle Mcmillan 34 y o  female MRN: [id number]  Unit/Bed#: L&D 309-01 Encounter: [id number]    Assessment and [facility] is a patient of: [facility] (Dr Roel Landa)  She is PPD# 1 s/p   Recovering well and is stable       Pyelectasis of fetus on prenatal ultrasound  Assessment & Plan  Bilateral pyelectasis of 4 mm  F/u outpatient    History of recreational drug use  Assessment & Plan  Used for many years with intermittent sobriety, last use at the beginning of this pregnancy  Attends weekly group therapy  UDS neg on admission    39 weeks gestation of pregnancy  Assessment & Plan  Lochia WNL   Recovering well   Appropriate bowel and bladder function   Pain well controlled   Tolerating diet   Breastfeeding:  yes  Ambulating without issues   No lower extremity tenderness  GBS negative    Rh positive    One elevated Temp during labor 102 7, afebrile overnight      * Gestational hypertension  Assessment & Plan  Systolic (22UHZ), RAW:139 , Min:124 , YOU:161     Diastolic (25XSL), JJF:17, Min:71, Max:83    Cbc, cmp wnl P:C 0 11      Disposition    - Anticipate discharge home on POD# 1-2      Subjective/Objective     Chief Complaint: Postpartum State     Subjective:    Jonelle Mcmillan is PPD/POD#1 s/p   She has no current complaints  Pain is well controlled  Patient is currently voiding  She is ambulating  Patient is currently passing flatus and has had no bowel movement  She is tolerating PO, and denies nausea or vomitting  Patient denies fever, chills, chest pain, shortness of breath, or calf tenderness  Lochia is normal  She is  Breastfeeding  She reports mild headache but has not taken tylenol yet  She denies chest pain, shortness of breath , ruq/epigastric pain  She is recovering well and is stable         Vitals:   /71 (BP Location: Right arm)   Pulse 87   Temp 97 5 °F (36 4 °C) (Temporal)   Resp 18   Ht 5' 5\" (1 651 m)   Wt 79 4 kg (175 " lb)   LMP 09/22/2022 (Approximate)   SpO2 97%   Breastfeeding Yes   BMI 29 12 kg/m²       Intake/Output Summary (Last 24 hours) at 6/22/2023 0594  Last data filed at 6/21/2023 1900  Gross per 24 hour   Intake --   Output 1035 ml   Net -1035 ml       Invasive Devices     Peripheral Intravenous Line  Duration           Peripheral IV 06/20/23 Left Antecubital 1 day                Physical Exam:   GEN: Wendy Robins appears well, alert and oriented x 3, pleasant and cooperative   CARDIO: RRR, no murmurs or rubs  RESP:  CTAB, no wheezes or rales  ABDOMEN: soft, no tenderness, no distention, fundus @ 2 cm below u , Incision C/D/I  EXTREMITIES:  non tender, no erythema      Labs:     Hemoglobin   Date Value Ref Range Status   06/20/2023 10 4 (L) 11 5 - 15 4 g/dL Final   06/09/2023 10 4 (L) 11 5 - 15 4 g/dL Final     WBC   Date Value Ref Range Status   06/20/2023 9 21 4 31 - 10 16 Thousand/uL Final   06/09/2023 9 60 4 31 - 10 16 Thousand/uL Final     Platelets   Date Value Ref Range Status   06/20/2023 262 149 - 390 Thousands/uL Final   06/09/2023 264 149 - 390 Thousands/uL Final     Creatinine   Date Value Ref Range Status   06/20/2023 0 82 0 60 - 1 30 mg/dL Final     Comment:     Standardized to IDMS reference method   06/09/2023 0 84 0 60 - 1 30 mg/dL Final     Comment:     Standardized to IDMS reference method     AST   Date Value Ref Range Status   06/20/2023 16 13 - 39 U/L Final   06/09/2023 22 5 - 45 U/L Final     Comment:     Specimen collection should occur prior to Sulfasalazine administration due to the potential for falsely depressed results  ALT   Date Value Ref Range Status   06/20/2023 10 7 - 52 U/L Final     Comment:     Specimen collection should occur prior to Sulfasalazine administration due to the potential for falsely depressed results      06/09/2023 18 12 - 78 U/L Final     Comment:     Specimen collection should occur prior to Sulfasalazine and/or Sulfapyridine administration due to the potential for falsely depressed results             Hank Roach MD  6/22/2023  6:28 AM

## 2023-06-22 NOTE — UTILIZATION REVIEW
"NOTIFICATION OF INPATIENT ADMISSION   MATERNITY/DELIVERY AUTHORIZATION REQUEST   SERVICING FACILITY:   [address] [place] [address] - L&D, [place], NICU  [address] [place] [address] [facility]  Tax ID: [id number]  NPI: [id number] ATTENDING PROVIDER:  Attending Name and NPI#: Bunny Lucas Md [[id number]]  Address: [address] [place] [address] [facility]  Phone: [phone]     ADMISSION INFORMATION:  Place of Service: Inpatient [address]  60W  Place of Service Code: 21  Inpatient Admission Date/Time: 23  8:51 PM  Discharge Date/Time: No discharge date for patient encounter  Admitting Diagnosis Code/Description:  Encounter for full-term uncomplicated delivery [Y91]     Mother: Shirin Loving 1990 Estimated Date of Delivery: 23  Delivering clinician: Bunny Lucas    OB History        2    Para   1    Term   1            AB   1    Living   1       SAB        IAB   1    Ectopic        Multiple   0    Live Births   1               [place] Name & MRN:   Information for the patient's :  Ana Jones [[id number]]      Delivery Information:  Sex: male  Delivered 2023 1:03 PM by Vaginal, Spontaneous; Gestational Age: 43w3d     Measurements:  Weight: 7 lb 0 3 oz (3184 g); Height: 19 5\"    APGAR 1 minute 5 minutes 10 minutes   Totals: 9 9      [place] Birth Information: 34 y o  female MRN: [id number] Unit/Bed#: L&D 309-01   Birthweight: No birth weight on file  Gestational Age: <None> Delivery Type:    APGARS Totals:        UTILIZATION REVIEW CONTACT:  Yvonne Wisdom Utilization   Network Utilization Review Department  Phone: [phone]  Fax [phone]  Email: [email or web address]  Contact for approvals/pending authorizations, clinical reviews, and discharge       PHYSICIAN ADVISORY SERVICES:  Medical Necessity Denial & Hhhy-vr-Glwg Review  Phone: [phone]  Fax: " [phone]  Email: [email or web address]

## 2023-06-22 NOTE — PLAN OF CARE
Problem: POSTPARTUM  Goal: Experiences normal postpartum course  Description: INTERVENTIONS:  - Monitor maternal vital signs  - Assess uterine involution and lochia  Outcome: Completed  Goal: Appropriate maternal -  bonding  Description: INTERVENTIONS:  - Identify family support  - Assess for appropriate maternal/infant bonding   -Encourage maternal/infant bonding opportunities  - Referral to  or  as needed  Outcome: Completed  Goal: Establishment of infant feeding pattern  Description: INTERVENTIONS:  - Assess breast/bottle feeding  - Refer to lactation as needed  Outcome: Completed  Goal: Incision(s), wounds(s) or drain site(s) healing without S/S of infection  Description: INTERVENTIONS  - Assess and document dressing, incision, wound bed, drain sites and surrounding tissue  - Provide patient and family education  - Perform skin care/dressing changes every : Completed

## 2023-06-22 NOTE — CASE MANAGEMENT
Case Management Progress Note    Patient name Linnea Rudd  Location L&D 309/L&D 699-22 MRN [id number]  : 1990 Date 2023       LOS (days): 2  Geometric Mean LOS (GMLOS) (days):   Days to [address]:        OBJECTIVE:        Current admission status: Inpatient  Preferred Pharmacy:   [address], [address] [facility] [address]  Phone: [phone] Fax: [phone]    Primary Care Provider: No primary care provider on file  Primary Insurance: BLUE CROSS  Secondary Insurance: [employer]    PROGRESS NOTE:    Consult(s): breast pump/hx D+A     CM met w/MOB who provided the following information:      • Baby's name/gender: Baby Boy Mesfin   • Mother of baby: Sandhya Toure   • Father of baby//SO: Dwayne Graham  • Other Legal Guardian(s) for baby: N/A  • Alternate emergency contact: N/A  • Other children: N/A  • Lives with: FOB   • Support System: Family/friends    • Baby Supplies: MOB confirmed having all needed baby supplies   • Bottle or Breast Feeding: Breast feeding   • Breast Pump if breast feeding: MOB requesting Spectra S2  Storkpump approved order  Spectra delivered to [facility] at bedside  Delivery ticket placed in bin for DME liaison  • [address] Programs/WIC/EBT/SSI:  MOB denies   • Work/School:  Both MOB and FOB are employed  MOB has a remote job  • Transportation: Both MOB/FOB drive but share one car  • Prenatal care:  [facility]   • Pediatrician:  [facility] Hx or Treatment: MOB denies   • Substance Abuse: MOB has history of cocaine use  MOB reported she stopped using when she found out she was pregnant  MOB stated she graduated from OP D+A tx  She does not remember the name of the tx center  MOB was going to  meetings in the past but since obtaining a full time job and being pregnant she has not gone recently   MOB reported having a good support system to help her remain clean  Both MOB and baby's UDS negative  CM informed MOB of UDS results and explained sending of cord blood for additional testing  CM informed MOB that if cord blood results are positive, CM will call MOB and place a referral to [facility]  MOB expressed understanding  • Legal (probation/parole/incarceration): MOB denies   • Community Referrals/C&Y/NFP: MOB denies    • Insurance for baby: [employer]      MOB denies any other CM needs at this time  Encouraged family to contact CM as needed

## 2023-06-22 NOTE — ASSESSMENT & PLAN NOTE
Lochia WNL   Recovering well   Appropriate bowel and bladder function   Pain well controlled   Tolerating diet   Breastfeeding:  yes  Ambulating without issues   No lower extremity tenderness  GBS negative    Rh positive    One elevated Temp during labor 102 7, afebrile overnight

## 2023-06-22 NOTE — ASSESSMENT & PLAN NOTE
Systolic (33MAU), EQP:710 , Min:124 , BFL:856     Diastolic (04PMU), MAF:42, Min:71, Max:83    Cbc, cmp wnl P:C 0 11

## 2023-06-26 NOTE — UTILIZATION REVIEW
NOTIFICATION OF ADMISSION DISCHARGE   This is a Notification of Discharge from [address]  Please be advised that this patient has been discharge from our facility  Below you will find the admission and discharge date and time including the patient’s disposition  UTILIZATION REVIEW CONTACT:  Michael Huertas  Utilization   Network Utilization Review Department  Phone: [phone] x carefully listen to the prompts  All voicemails are confidential   Email: [email or web address]     ADMISSION INFORMATION  PRESENTATION DATE: 6/20/2023  8:19 PM  OBERVATION ADMISSION DATE:   INPATIENT ADMISSION DATE: 6/20/23  8:51 PM   DISCHARGE DATE: 6/22/2023  7:07 PM   DISPOSITION:Home/Self Care    IMPORTANT INFORMATION:  Send all requests for admission clinical reviews, approved or denied determinations and any other requests to dedicated fax number below belonging to the campus where the patient is receiving treatment   List of dedicated fax numbers:  [address] DENIALS (Administrative/Medical Necessity) [phone]   [address] (Maternity/NICU/Pediatrics) [phone]   [facility] [phone]   [facility] 87 [phone]   [address] [phone]   220 [facility] [phone] 90 [facility] [phone]   [address] [place], [place] [address] [phone]   [facility]  [phone]   [facility] [phone]   412 [facility] [address] [facility] [phone]

## 2023-06-27 ENCOUNTER — POSTPARTUM VISIT (OUTPATIENT)
Dept: OBGYN CLINIC | Facility: CLINIC | Age: 33
End: 2023-06-27

## 2023-06-27 VITALS
DIASTOLIC BLOOD PRESSURE: 74 MMHG | HEART RATE: 78 BPM | HEIGHT: 65 IN | TEMPERATURE: 97.5 F | WEIGHT: 162.4 LBS | OXYGEN SATURATION: 98 % | BODY MASS INDEX: 27.06 KG/M2 | SYSTOLIC BLOOD PRESSURE: 124 MMHG

## 2023-06-27 DIAGNOSIS — O13.9 GESTATIONAL HYPERTENSION, ANTEPARTUM: Primary | ICD-10-CM

## 2023-06-27 PROCEDURE — 99024 POSTOP FOLLOW-UP VISIT: CPT | Performed by: OBSTETRICS & GYNECOLOGY

## 2023-06-27 NOTE — PATIENT INSTRUCTIONS
Check BP daily  Call office if >140/90 consistently   Call office urgently if >160/110 or if concerning symptoms

## 2023-06-27 NOTE — PROGRESS NOTES
Patient ID: Tabatha Harris is a 34 y o  female  Chief Complaint   Patient presents with   • Postpartum Care     PPD Scale-4;  ; Pt had lactation appt and BP was high, they were concerned, pt does not remember what it was         She presents for routine postpartum visit  She is now a  who is 6 days postpartum s/p  on 23 - elective IOL at 39 weeks   Anesthesia: epidural   Perineum: superficial left vaginal laceration    Postpartum course was complicated by diagnosis of gestational HTN dx in labor - but did not have persistent elevations requiring antihypertensive therapy   She was at a lactation consultation today and BP was noted to be elevated to    Went to pediatrician urgently yesterday - baby had lost weight  Pt has not had issues with milk supply but LC evaluation today indicates latch was poor  Syringe feeding and using nipple shield  Gained weight as of today  Next weight check is Friday   No HA/visual changes/CP/SOB/N/V/RUQ pain     She denies abn bleeding, pelvic pain, breast complaints, bowel/bladder dysfunction, depression/anx  Rajiv Manner is thriving and is Breastfeeding    [place]  Depression Scale Total: 4  Plans for contraception: deferred         The following portions of the patient's history were reviewed and updated as appropriate: allergies, current medications, past family history, past medical history, past social history, past surgical history, and problem list     Review of Systems   Constitutional: Negative for chills and fever  Eyes: Negative for visual disturbance  Respiratory: Negative for chest tightness and shortness of breath  Cardiovascular: Negative for chest pain  Gastrointestinal: Negative for abdominal pain, diarrhea, nausea and vomiting  Genitourinary: Negative for pelvic pain and vaginal bleeding  As noted in HPI   Skin: Negative for rash  Neurological: Negative for headaches     All other systems reviewed and "are negative  Objective:  /74 (BP Location: Right arm, Patient Position: Sitting, Cuff Size: Adult)   Pulse 78   Temp 97 5 °F (36 4 °C) (Tympanic)   Ht 5' 5\" (1 651 m)   Wt 73 7 kg (162 lb 6 4 oz)   LMP 2022 (Approximate)   SpO2 98%   Breastfeeding Yes   BMI 27 02 kg/m²     Physical Exam  Constitutional:       General: She is not in acute distress  Appearance: Normal appearance  HENT:      Head: Normocephalic and atraumatic  Cardiovascular:      Rate and Rhythm: Normal rate  Pulmonary:      Effort: Pulmonary effort is normal  No respiratory distress  Abdominal:      General: There is no distension  Palpations: Abdomen is soft  Tenderness: There is no abdominal tenderness  There is no guarding or rebound  Neurological:      General: No focal deficit present  Mental Status: She is alert  Deep Tendon Reflexes:      Reflex Scores:       Patellar reflexes are 2+ on the right side and 2+ on the left side  Psychiatric:         Mood and Affect: Mood normal          Behavior: Behavior normal    Vitals and nursing note reviewed  Postpartum Depression: Low Risk  (2023)    [place]  Depression Scale    • Last EPDS Total Score: 4    • Last EPDS Self Harm Result: Never         Assessment/Plan:    Problem List Items Addressed This Visit        Cardiovascular and Mediastinum    Gestational hypertension - Primary     BP normal today  No concerning sx  Advised obtaining BP cuff and logging daily  Will f/u in 1 week   Strict precautions reviewed                              "

## 2023-06-27 NOTE — ASSESSMENT & PLAN NOTE
BP normal today  No concerning sx  Advised obtaining BP cuff and logging daily  Will f/u in 1 week   Strict precautions reviewed

## 2023-06-29 LAB — PLACENTA IN STORAGE: NORMAL

## 2023-07-03 PROBLEM — Z34.80 SUPERVISION OF OTHER NORMAL PREGNANCY, ANTEPARTUM: Status: RESOLVED | Noted: 2022-12-08 | Resolved: 2023-07-03

## 2023-07-03 PROBLEM — O35.EXX0 PYELECTASIS OF FETUS ON PRENATAL ULTRASOUND: Status: RESOLVED | Noted: 2023-02-09 | Resolved: 2023-07-03

## 2023-07-03 PROBLEM — Z3A.39 39 WEEKS GESTATION OF PREGNANCY: Status: RESOLVED | Noted: 2022-12-08 | Resolved: 2023-07-03

## 2023-07-03 PROBLEM — O26.00 EXCESSIVE WEIGHT GAIN IN PREGNANCY: Status: RESOLVED | Noted: 2023-05-02 | Resolved: 2023-07-03

## 2023-07-18 ENCOUNTER — POSTPARTUM VISIT (OUTPATIENT)
Dept: OBGYN CLINIC | Facility: CLINIC | Age: 33
End: 2023-07-18

## 2023-07-18 VITALS
HEIGHT: 65 IN | HEART RATE: 78 BPM | SYSTOLIC BLOOD PRESSURE: 122 MMHG | DIASTOLIC BLOOD PRESSURE: 72 MMHG | WEIGHT: 154 LBS | BODY MASS INDEX: 25.66 KG/M2 | OXYGEN SATURATION: 99 %

## 2023-07-18 DIAGNOSIS — O13.9 GESTATIONAL HYPERTENSION, ANTEPARTUM: ICD-10-CM

## 2023-07-18 PROCEDURE — PNV: Performed by: OBSTETRICS & GYNECOLOGY

## 2023-07-18 NOTE — ASSESSMENT & PLAN NOTE
4 wks PostPartum. Normal postpartum exam.   The patient may advance activity as tolerated and may resume sexual activity at 6 wks PP. Contraception discussed. Patient undecided. To discuss more at next visit   EDPS score 3.  Signs and symptoms of postpartum depression reviewed  Repeat pap needed at postpartum visit, HPV+ last year  Precautions reviewed

## 2023-07-18 NOTE — PROGRESS NOTES
Patient ID: Emmanuelle Lobato is a 34 y.o. female. Chief Complaint   Patient presents with   • Postpartum Care     PPD: 3       She presents for routine postpartum visit. She is now a Marichuy Peaches who is 4 wks s/p  on  - elective IOL 39 weeks   Anesthesia: epidural   Perineum: superficial left vaginal laceration     Postpartum course was complicated by diagnosis of gestational HTN dx in labor - but did not have persistent elevations requiring antihypertensive therapy   Seen on  for elevated BP at [facility] office  Sine then has been checking home BP's: 120's/70's    Exclusively Pumping every 2-3 hours due to baby's slow weight gain initially   No longer bleeding  No vaginal pain       Plans for contraception: undecided        The following portions of the patient's history were reviewed and updated as appropriate: allergies, current medications, past family history, past medical history, past social history, past surgical history, and problem list.    Review of Systems   Constitutional: Negative for appetite change, chills and fever. Eyes: Negative for visual disturbance. Respiratory: Negative for cough, chest tightness and shortness of breath. Cardiovascular: Negative for chest pain. Gastrointestinal: Negative for abdominal distention, abdominal pain, constipation, diarrhea, nausea and vomiting. Endocrine: Negative for cold intolerance and heat intolerance. Genitourinary: Negative for difficulty urinating, dyspareunia, dysuria, frequency, genital sores, pelvic pain, urgency, vaginal bleeding, vaginal discharge and vaginal pain. Musculoskeletal: Negative for arthralgias. Neurological: Negative for light-headedness and headaches. Hematological: Does not bruise/bleed easily. Psychiatric/Behavioral: Negative for behavioral problems. All other systems reviewed and are negative.                Objective:  /72   Pulse 78   Ht 5' 5" (1.651 m)   Wt 69.9 kg (154 lb)   SpO2 99% Breastfeeding Yes Comment: pumping  BMI 25.63 kg/m²     Physical Exam  Constitutional:       General: She is not in acute distress. Appearance: Normal appearance. HENT:      Head: Normocephalic and atraumatic. Cardiovascular:      Rate and Rhythm: Normal rate. Pulmonary:      Effort: Pulmonary effort is normal. No respiratory distress. Neurological:      General: No focal deficit present. Mental Status: She is alert. Psychiatric:         Mood and Affect: Mood normal.         Behavior: Behavior normal.   Vitals and nursing note reviewed. EDPS score 3      Assessment/Plan:    Problem List Items Addressed This Visit        Cardiovascular and Mediastinum    Gestational hypertension     BP's normal. No signs/sx preeclampsia             Other    Encounter for routine postpartum follow-up - Primary     4 wks PostPartum. Normal postpartum exam.   The patient may advance activity as tolerated and may resume sexual activity at 6 wks PP. Contraception discussed. Patient undecided. To discuss more at next visit   EDPS score 3.  Signs and symptoms of postpartum depression reviewed  Repeat pap needed at postpartum visit, HPV+ last year  Precautions reviewed

## 2023-07-18 NOTE — PATIENT INSTRUCTIONS
Vaginal moisturizers can be used every day for 2 weeks, then twice weekly after that. I recommend water or silicone based lubricants such as Replens, HyaloGYN, Uberlube, Yes!, Revaree.

## 2023-08-14 ENCOUNTER — POSTPARTUM VISIT (OUTPATIENT)
Dept: OBGYN CLINIC | Facility: CLINIC | Age: 33
End: 2023-08-14

## 2023-08-14 VITALS
DIASTOLIC BLOOD PRESSURE: 58 MMHG | BODY MASS INDEX: 25.79 KG/M2 | TEMPERATURE: 97.9 F | WEIGHT: 154.8 LBS | SYSTOLIC BLOOD PRESSURE: 106 MMHG | HEART RATE: 79 BPM | HEIGHT: 65 IN | OXYGEN SATURATION: 97 %

## 2023-08-14 DIAGNOSIS — Z30.011 OCP (ORAL CONTRACEPTIVE PILLS) INITIATION: ICD-10-CM

## 2023-08-14 DIAGNOSIS — Z12.4 SCREENING FOR CERVICAL CANCER: ICD-10-CM

## 2023-08-14 PROBLEM — N61.1 ABSCESS OF RIGHT BREAST: Status: RESOLVED | Noted: 2023-01-30 | Resolved: 2023-08-14

## 2023-08-14 PROCEDURE — G0145 SCR C/V CYTO,THINLAYER,RESCR: HCPCS | Performed by: OBSTETRICS & GYNECOLOGY

## 2023-08-14 PROCEDURE — G0476 HPV COMBO ASSAY CA SCREEN: HCPCS | Performed by: OBSTETRICS & GYNECOLOGY

## 2023-08-14 PROCEDURE — 99024 POSTOP FOLLOW-UP VISIT: CPT | Performed by: OBSTETRICS & GYNECOLOGY

## 2023-08-14 RX ORDER — NORETHINDRONE ACETATE AND ETHINYL ESTRADIOL 1; .02 MG/1; MG/1
1 TABLET ORAL DAILY
Qty: 84 TABLET | Refills: 4 | Status: SHIPPED | OUTPATIENT
Start: 2023-08-14

## 2023-08-14 NOTE — PROGRESS NOTES
Patient ID: Vik Sanchez is a 28 y.o. female. Chief Complaint   Patient presents with   • Postpartum Care     PPD= 5;  ; no concerns; pt would like to discuss birth control options         She presents for routine postpartum visit. She is now a  who is 7+ wks s/p  on  - elective IOL 39 weeks   Anesthesia: epidural   Perineum: superficial left vaginal laceration     Postpartum course was complicated by diagnosis of gestational HTN dx in labor - but did not have persistent elevations requiring antihypertensive therapy       Postpartum course was otherwise uncomplicated. Since d/c home she has had no complaints. She denies abn bleeding, pelvic pain, breast complaints, bowel/bladder dysfunction, depression/anx. Alfonso Rodriguez is thriving and is Breastfeeding/via pumping exclusively. Plans to start transitioning to formula around 4 months     Has been SA since about 4 weeks postpartum     [place]  Depression Scale Total: 5  Plans for contraception: undecided. Has only been on OCP previously     Pap 2022 NILM/HPV+      The following portions of the patient's history were reviewed and updated as appropriate: allergies, current medications, past family history, past medical history, past social history, past surgical history, and problem list.    Review of Systems   Constitutional: Negative for appetite change, chills and fever. Eyes: Negative for visual disturbance. Respiratory: Negative for cough, chest tightness and shortness of breath. Cardiovascular: Negative for chest pain. Gastrointestinal: Negative for abdominal distention, abdominal pain, constipation, diarrhea, nausea and vomiting. Endocrine: Negative for cold intolerance and heat intolerance. Genitourinary: Negative for difficulty urinating, dyspareunia, dysuria, frequency, genital sores, pelvic pain, urgency, vaginal bleeding, vaginal discharge and vaginal pain.    Musculoskeletal: Negative for arthralgias. Neurological: Negative for light-headedness and headaches. Hematological: Does not bruise/bleed easily. Psychiatric/Behavioral: Negative for behavioral problems. All other systems reviewed and are negative. Objective:  /58 (BP Location: Right arm, Patient Position: Sitting, Cuff Size: Adult)   Pulse 79   Temp 97.9 °F (36.6 °C) (Tympanic)   Ht 5' 5" (1.651 m)   Wt 70.2 kg (154 lb 12.8 oz)   SpO2 97%   Breastfeeding Yes   BMI 25.76 kg/m²     Physical Exam  Constitutional:       General: She is not in acute distress. Appearance: Normal appearance. She is not ill-appearing. Genitourinary:      Bladder and urethral meatus normal.      Right Labia: No rash, tenderness, lesions or skin changes. Left Labia: No tenderness, lesions, skin changes or rash. No labial fusion noted. No inguinal adenopathy present in the right or left side. No vaginal discharge, erythema, tenderness, bleeding or ulceration. Right Adnexa: not tender, not full and no mass present. Left Adnexa: not tender, not full and no mass present. No cervical motion tenderness, discharge, friability, lesion, polyp or eversion. Uterus is not enlarged, fixed, tender or irregular. No uterine mass detected. Uterus is anteverted. Pelvic exam was performed with patient in the lithotomy position. HENT:      Head: Normocephalic. Cardiovascular:      Rate and Rhythm: Normal rate. Heart sounds: Normal heart sounds. Pulmonary:      Effort: Pulmonary effort is normal. No accessory muscle usage or respiratory distress. Abdominal:      General: There is no distension. Palpations: Abdomen is soft. There is no mass. Tenderness: There is no abdominal tenderness. There is no guarding or rebound. Musculoskeletal:         General: Normal range of motion. Cervical back: No rigidity. Lymphadenopathy:      Lower Body: No right inguinal adenopathy.  No left inguinal adenopathy. Neurological:      General: No focal deficit present. Mental Status: She is alert. Mental status is at baseline. Skin:     General: Skin is warm and dry. Psychiatric:         Mood and Affect: Mood normal.         Behavior: Behavior normal.   Vitals and nursing note reviewed. Exam conducted with a chaperone present. Postpartum Depression: Medium Risk (2023)    [place]  Depression Scale    • Last EPDS Total Score: 5    • Last EPDS Self Harm Result: Never         Assessment/Plan:    Problem List Items Addressed This Visit        Other    Encounter for routine postpartum follow-up - Primary     7 wks PostPartum. Normal postpartum exam.   The patient may advance activity as tolerated. Contraception discussed. Patient desires a method that will result in predictable bleeding. She is less concerned about potential impact on milk supply. 10 mcg OCP is not covered by insurance therefore she is amenable to 20 mcg pill - rx for junel sent. Advised can start now and to check a home UPT in next 3-4 weeks given she has had unprotected intercourse in last few weeks. Advised condom use for at least first 7 days of use as back up  EDPS score 5.  Signs and symptoms of postpartum depression reviewed  Pap smear with HPV testing obtained today  F/u for annual exam or as needed           Other Visit Diagnoses     OCP (oral contraceptive pills) initiation        Relevant Medications    norethindrone-ethinyl estradiol (Loestrin , ,) 1-20 MG-MCG per tablet    Screening for cervical cancer        Relevant Orders    Liquid-based pap, screening

## 2023-08-14 NOTE — PATIENT INSTRUCTIONS
Try and take your birth control pill around the same time daily. Setting an alarm on your phone can be helpful for this. A pill is not considered late until it is 24 hours beyond when you would normally take it. - If you miss one pill, take it as soon as you remember and continue the rest of the pack  - If you miss two pills in a row, you should take them as soon as you remember and use condoms as back up contraception for the rest of the pack  - If you miss two pills in the FIRST WEEK of the pack, you should stop taking the pills, consider taking emergency contraception (Plan B) if you have had intercourse during this time, and use condoms until your next period begins again at which point you can resume taking the birth control pill from a new pack. This is also the case if you ever miss three or more pills in a row. Whenever you are resuming birth control pills (going back on the pill after switching from another method or from no method), you need to use condoms for at least the first 7 days. Birth control pills do NOT protect against sexually transmitted infections - only consistent condom use does. Common symptoms while taking birth control pills include:   - nausea, breast tenderness, headaches: usually resolve within first few months of use  - unscheduled bleeding (AKA breakthrough bleeding): often improves within first few months of use  - lack of any bleeding: this is normal for some women on the pill. It does not mean that the pill is not protecting against pregnancy, as long as you are taking your pills consistently/correctly. Birth control pills are NOT consistently associated with:  - weight gain, mood changes, change in libido, infertility  - they do NOT increase the risk of breast, ovarian, or uterine cancer. Birth control pills are % effective at preventing pregnancy - no method is! With perfect use the failure rate is ~ 0.3%.  With typical use, failure rate is closer to 7%.

## 2023-08-14 NOTE — ASSESSMENT & PLAN NOTE
7 wks PostPartum. Normal postpartum exam.   The patient may advance activity as tolerated. Contraception discussed. Patient desires a method that will result in predictable bleeding. She is less concerned about potential impact on milk supply. 10 mcg OCP is not covered by insurance therefore she is amenable to 20 mcg pill - rx for junel sent. Advised can start now and to check a home UPT in next 3-4 weeks given she has had unprotected intercourse in last few weeks. Advised condom use for at least first 7 days of use as back up  EDPS score 5.  Signs and symptoms of postpartum depression reviewed  Pap smear with HPV testing obtained today  F/u for annual exam or as needed

## 2023-08-15 LAB
HPV HR 12 DNA CVX QL NAA+PROBE: NEGATIVE
HPV16 DNA CVX QL NAA+PROBE: NEGATIVE
HPV18 DNA CVX QL NAA+PROBE: NEGATIVE

## 2023-08-18 LAB
LAB AP GYN PRIMARY INTERPRETATION: NORMAL
Lab: NORMAL
PATH INTERP SPEC-IMP: NORMAL

## 2023-11-27 NOTE — LACTATION NOTE
This note was copied from a baby's chart  CONSULT - LACTATION  Baby Boy Alysa Bibles) [address] 1 days male MRN: [id number]    [facility] NURSERY Room / Bed: L&D 309(N)/L&D 309(N) Encounter: [id number]    Maternal Information     MOTHER:  Sandhya Toure  Maternal Age: 34 y o    OB History: # 1 - Date: None, Sex: None, Weight: None, GA: None, Delivery: None, Apgar1: None, Apgar5: None, Living: None, Birth Comments: None    # 2 - Date: 23, Sex: Male, Weight: 3184 g (7 lb 0 3 oz), GA: 39w4d, Delivery: Vaginal, Spontaneous, Apgar1: 9, Apgar5: 9, Living: Living, Birth Comments: None   Previouse breast reduction surgery?  No    Lactation history:   Has patient previously breast fed: No   How long had patient previously breast fed:     Previous breast feeding complications:       Past Surgical History:   Procedure Laterality Date   • BREAST BIOPSY     • US GUIDED BREAST BIOPSY RIGHT COMPLETE Right 2023   • US GUIDED BREAST LYMPH NODE BIOPSY RIGHT Right 2023        Birth information:  YOB: 2024   Time of birth: 1:03 PM   Sex: male   Delivery type: Vaginal, Spontaneous   Birth Weight: 3184 g (7 lb 0 3 oz)   Percent of Weight Change: 0%     Gestational Age: 43w3d   [unfilled]    Assessment     Breast and nipple assessment: flat nipple    [place] Assessment: suck issue (see table below)    Feeding assessment: latch difficulty (see table below)  LATCH:  Latch: Repeated attempts, hold nipple in mouth, stimulate to suck   Audible Swallowing: None   Type of Nipple: Flat   Comfort (Breast/Nipple): Soft/non-tender   Hold (Positioning): Full assist, staff holds infant at breast   LATCH Score: 4         Breasts/Nipples   Left Breast Soft   Right Breast Soft   Left Nipple Flat   Right Nipple Flat   Intervention Hand expression  (highly effective)   Breastfeeding Progress Not yet established   Breast Pump   Pump 3  (CM consult for SS2)   Patient Follow-Up   Lactation Consult Status 2   Follow-Up Type Inpatient;Call as needed   Other OB Lactation Documentation    Additional Problem Noted Cyrus Hopkins would not latch to the breast  HE is effective  Support and education offered surrounding optimum latch/positioning  (RSB reviewed  D/C booklet at bedside )     Feeding recommendations:  breast feed on demand     Information on hand expression given  Discussed benefits of knowing how to manually express breast including stimulating milk supply, softening nipple for latch and evacuating breast in the event of engorgement  Reviewed how to bring baby to the breast so that his lower lip and chin touch the breast with his nose just above the nipple to encourage a wider, more asymmetric latch  Met with mother  Provided mother with Ready, Set, Baby booklet which contained information on:  Hand expression with access to QR codes to review hand expression  Positioning and latch reviewed as well as showing images of other feeding positions  Discussed the properties of a good latch in any position  Feeding on cue and what that means for recognizing infant's hunger, s/s that baby is getting enough milk and some s/s that breastfeeding dyad may need further help  Skin to Skin contact an benefits to mom and baby  Avoidance of pacifiers for the first month discussed  Gave information on common concerns, what to expect the first few weeks after delivery, preparing for other caregivers, and how partners can help  Resources for support also provided  Encouraged parents to call for assistance, questions, and concerns about breastfeeding  Extension provided        Michael Patton RN 6/22/2023 11:16 AM DISPLAY PLAN FREE TEXT